# Patient Record
Sex: FEMALE | Race: BLACK OR AFRICAN AMERICAN | NOT HISPANIC OR LATINO | Employment: FULL TIME | URBAN - METROPOLITAN AREA
[De-identification: names, ages, dates, MRNs, and addresses within clinical notes are randomized per-mention and may not be internally consistent; named-entity substitution may affect disease eponyms.]

---

## 2017-01-12 ENCOUNTER — TELEPHONE (OUTPATIENT)
Dept: OBSTETRICS AND GYNECOLOGY | Facility: CLINIC | Age: 22
End: 2017-01-12

## 2017-01-12 NOTE — TELEPHONE ENCOUNTER
----- Message from Lyle Gonzalez sent at 1/12/2017 11:36 AM CST -----  Contact: pt   States she is calling to resched her nurse visit for Friday and can be reached at [phone]//thanks/dbw

## 2017-01-12 NOTE — TELEPHONE ENCOUNTER
----- Message from Martha Wild sent at 1/12/2017  3:35 PM CST -----  Contact: Pt   Pt called and stated she was returning a call to the nurse. She can be reached at [phone] (ryps)       Thanks

## 2017-01-12 NOTE — TELEPHONE ENCOUNTER
Phone just rings. Tried to reschedule nurse visit per Pt message but I do not see where there was one scheduled. Will try calling again. HIRA [place]

## 2017-01-13 ENCOUNTER — TELEPHONE (OUTPATIENT)
Dept: OBSTETRICS AND GYNECOLOGY | Facility: CLINIC | Age: 22
End: 2017-01-13

## 2017-01-13 NOTE — TELEPHONE ENCOUNTER
----- Message from Mary Carrera sent at 1/13/2017 12:46 PM CST -----  Contact: pt  Pt requests nurse to call her regarding scheduling her depo shot. Pt can be reached at [phone] (home)

## 2017-01-17 ENCOUNTER — CLINICAL SUPPORT (OUTPATIENT)
Dept: OBSTETRICS AND GYNECOLOGY | Facility: CLINIC | Age: 22
End: 2017-01-17
Payer: COMMERCIAL

## 2017-01-17 DIAGNOSIS — Z30.42 SURVEILLANCE FOR DEPO-PROVERA CONTRACEPTION: Primary | ICD-10-CM

## 2017-01-17 DIAGNOSIS — Z30.42 SURVEILLANCE FOR DEPO-PROVERA CONTRACEPTION: ICD-10-CM

## 2017-01-17 DIAGNOSIS — Z30.42 DEPO-PROVERA CONTRACEPTIVE STATUS: Primary | ICD-10-CM

## 2017-01-17 DIAGNOSIS — Z30.013 ENCOUNTER FOR INITIAL PRESCRIPTION OF INJECTABLE CONTRACEPTIVE: ICD-10-CM

## 2017-01-17 PROCEDURE — 99999 PR PBB SHADOW E&M-EST. PATIENT-LVL II: CPT | Mod: PBBFAC,,,

## 2017-01-17 PROCEDURE — 96372 THER/PROPH/DIAG INJ SC/IM: CPT | Mod: S$GLB,,, | Performed by: OBSTETRICS & GYNECOLOGY

## 2017-01-17 RX ORDER — MEDROXYPROGESTERONE ACETATE 150 MG/ML
150 INJECTION, SUSPENSION INTRAMUSCULAR ONCE
Status: DISCONTINUED | OUTPATIENT
Start: 2017-01-17 | End: 2017-06-30

## 2017-01-17 RX ORDER — MEDROXYPROGESTERONE ACETATE 150 MG/ML
150 INJECTION, SUSPENSION INTRAMUSCULAR
Status: DISCONTINUED | OUTPATIENT
Start: 2017-01-17 | End: 2017-06-30

## 2017-01-17 RX ORDER — MEDROXYPROGESTERONE ACETATE 150 MG/ML
150 INJECTION, SUSPENSION INTRAMUSCULAR ONCE
Status: CANCELLED | OUTPATIENT
Start: 2017-01-17 | End: 2017-01-17

## 2017-01-17 RX ORDER — MEDROXYPROGESTERONE ACETATE 150 MG/ML
150 INJECTION, SUSPENSION INTRAMUSCULAR ONCE
Status: COMPLETED | OUTPATIENT
Start: 2017-01-17 | End: 2017-01-17

## 2017-01-17 RX ADMIN — MEDROXYPROGESTERONE ACETATE 150 MG: 150 INJECTION, SUSPENSION INTRAMUSCULAR at 01:01

## 2017-01-17 NOTE — MR AVS SNAPSHOT
[facility] Obstetrics and Gynecology  [address]  [place] [address]  Phone: [phone]                  Mary Beth Cain   2017 10:30 AM   Clinical Support    Description:  Female : 1995   Provider:  OB GYN NURSEDORI   Department:  [place] - Obstetrics and Gynecology           Diagnoses this Visit        Comments    Depo-Provera contraceptive status    -  Primary            To Do List           Future Appointments        Provider Department Dept Phone    4/10/2017 9:00 AM [facility] [phone]      Goals (5 Years of Data)     None      [facility]     [facility] -  Assistance  Registered nurses in the [facility] provide clinical advisement, health education, appointment booking, and other advisory services.  Call for this free service at [phone].             Medications           Message regarding Medications     Verify the changes and/or additions to your medication regime listed below are the same as discussed with your clinician today.  If any of these changes or additions are incorrect, please notify your healthcare provider.             Verify that the below list of medications is an accurate representation of the medications you are currently taking.  If none reported, the list may be blank. If incorrect, please contact your healthcare provider. Carry this list with you in case of emergency.           Current Medications     divalproex (DEPAKOTE SPRINKLE) 125 mg capsule Take 1 at bedtime    levocetirizine (XYZAL) 5 MG tablet Take 5 mg by mouth every evening.    montelukast (SINGULAIR) 5 MG chewable tablet Take 5 mg by mouth once daily.    ranitidine (ZANTAC) 150 MG tablet Take 150 mg by mouth 2 (two) times daily.           Clinical Reference Information           Allergies as of 2017     No Known Allergies      Immunizations Administered on Date of Encounter - 2017      None      [facility] Sign-Up     Activating your [facility] account is as easy as 1-2-3!     1) Visit [email or web address], select Sign Up Now, enter this activation code and your date of birth, then select Next.  [email or web address]  Expires: 3/3/2017 10:26 AM      2) Create a username and password to use when you visit [facility] in the future and select a security question in case you lose your password and select Next.    3) Enter your e-mail address and click Sign Up!    Additional Information  If you have questions, please e-mail [email or web address] or call [phone] to talk to our [facility] staff. Remember, [facility] is NOT to be used for urgent needs. For medical emergencies, dial 911.

## 2017-01-17 NOTE — PROGRESS NOTES
Per Dr. Sánchez' records, patient's last injection was 10/19/16 and her last annual was 7/21/16.  Depo Provera 150mg given IM without difficulty.  Patient tolerated well.  Patient instructed to remain in clinic for 15 minutes following injection.  Appointment given for next injection and AVS handed to patient.  Patient verbalized understanding of instructions.

## 2017-04-10 ENCOUNTER — LAB VISIT (OUTPATIENT)
Dept: LAB | Facility: HOSPITAL | Age: 22
End: 2017-04-10
Attending: INTERNAL MEDICINE
Payer: COMMERCIAL

## 2017-04-10 ENCOUNTER — OFFICE VISIT (OUTPATIENT)
Dept: INTERNAL MEDICINE | Facility: CLINIC | Age: 22
End: 2017-04-10
Payer: COMMERCIAL

## 2017-04-10 ENCOUNTER — CLINICAL SUPPORT (OUTPATIENT)
Dept: OBSTETRICS AND GYNECOLOGY | Facility: CLINIC | Age: 22
End: 2017-04-10
Payer: COMMERCIAL

## 2017-04-10 VITALS
OXYGEN SATURATION: 99 % | HEIGHT: 66 IN | RESPIRATION RATE: 18 BRPM | TEMPERATURE: 98 F | WEIGHT: 167.75 LBS | SYSTOLIC BLOOD PRESSURE: 130 MMHG | DIASTOLIC BLOOD PRESSURE: 82 MMHG | HEART RATE: 75 BPM | BODY MASS INDEX: 26.96 KG/M2

## 2017-04-10 DIAGNOSIS — M25.571 ACUTE RIGHT ANKLE PAIN: ICD-10-CM

## 2017-04-10 DIAGNOSIS — R60.0 LOCALIZED EDEMA: ICD-10-CM

## 2017-04-10 DIAGNOSIS — K21.9 GASTROESOPHAGEAL REFLUX DISEASE, ESOPHAGITIS PRESENCE NOT SPECIFIED: ICD-10-CM

## 2017-04-10 DIAGNOSIS — K59.00 CONSTIPATION, UNSPECIFIED CONSTIPATION TYPE: ICD-10-CM

## 2017-04-10 DIAGNOSIS — Z30.42 ENCOUNTER FOR SURVEILLANCE OF INJECTABLE CONTRACEPTIVE: Primary | ICD-10-CM

## 2017-04-10 DIAGNOSIS — K21.9 GASTROESOPHAGEAL REFLUX DISEASE, ESOPHAGITIS PRESENCE NOT SPECIFIED: Primary | ICD-10-CM

## 2017-04-10 LAB
25(OH)D3+25(OH)D2 SERPL-MCNC: 23 NG/ML
ALBUMIN SERPL BCP-MCNC: 4 G/DL
ALP SERPL-CCNC: 53 U/L
ALT SERPL W/O P-5'-P-CCNC: 21 U/L
ANION GAP SERPL CALC-SCNC: 10 MMOL/L
AST SERPL-CCNC: 21 U/L
BACTERIA #/AREA URNS AUTO: NORMAL /HPF
BASOPHILS # BLD AUTO: 0.02 K/UL
BASOPHILS NFR BLD: 0.3 %
BILIRUB SERPL-MCNC: 0.4 MG/DL
BUN SERPL-MCNC: 13 MG/DL
CALCIUM SERPL-MCNC: 9.5 MG/DL
CHLORIDE SERPL-SCNC: 108 MMOL/L
CO2 SERPL-SCNC: 21 MMOL/L
CREAT SERPL-MCNC: 0.9 MG/DL
CRP SERPL-MCNC: 0.3 MG/L
DIFFERENTIAL METHOD: ABNORMAL
EOSINOPHIL # BLD AUTO: 0.8 K/UL
EOSINOPHIL NFR BLD: 14.1 %
ERYTHROCYTE [DISTWIDTH] IN BLOOD BY AUTOMATED COUNT: 12.6 %
ERYTHROCYTE [SEDIMENTATION RATE] IN BLOOD BY WESTERGREN METHOD: 14 MM/HR
EST. GFR  (AFRICAN AMERICAN): >60 ML/MIN/1.73 M^2
EST. GFR  (NON AFRICAN AMERICAN): >60 ML/MIN/1.73 M^2
GLUCOSE SERPL-MCNC: 81 MG/DL
HCT VFR BLD AUTO: 38.4 %
HGB BLD-MCNC: 13.1 G/DL
LYMPHOCYTES # BLD AUTO: 2.2 K/UL
LYMPHOCYTES NFR BLD: 36.7 %
MCH RBC QN AUTO: 31.3 PG
MCHC RBC AUTO-ENTMCNC: 34.1 %
MCV RBC AUTO: 92 FL
MICROSCOPIC COMMENT: NORMAL
MONOCYTES # BLD AUTO: 0.6 K/UL
MONOCYTES NFR BLD: 10.7 %
NEUTROPHILS # BLD AUTO: 2.3 K/UL
NEUTROPHILS NFR BLD: 38.2 %
PLATELET # BLD AUTO: 211 K/UL
PMV BLD AUTO: 11.1 FL
POTASSIUM SERPL-SCNC: 4.3 MMOL/L
PROT SERPL-MCNC: 7.6 G/DL
RBC # BLD AUTO: 4.18 M/UL
RBC #/AREA URNS AUTO: 1 /HPF (ref 0–4)
SODIUM SERPL-SCNC: 139 MMOL/L
SQUAMOUS #/AREA URNS AUTO: 2 /HPF
T4 FREE SERPL-MCNC: 1 NG/DL
TSH SERPL DL<=0.005 MIU/L-ACNC: 2.34 UIU/ML
WBC # BLD AUTO: 5.89 K/UL
WBC #/AREA URNS AUTO: 2 /HPF (ref 0–5)

## 2017-04-10 PROCEDURE — 99204 OFFICE O/P NEW MOD 45 MIN: CPT | Mod: 25,S$GLB,, | Performed by: INTERNAL MEDICINE

## 2017-04-10 PROCEDURE — 1160F RVW MEDS BY RX/DR IN RCRD: CPT | Mod: S$GLB,,, | Performed by: INTERNAL MEDICINE

## 2017-04-10 PROCEDURE — 85025 COMPLETE CBC W/AUTO DIFF WBC: CPT

## 2017-04-10 PROCEDURE — 36415 COLL VENOUS BLD VENIPUNCTURE: CPT | Mod: PO

## 2017-04-10 PROCEDURE — 99999 PR PBB SHADOW E&M-EST. PATIENT-LVL II: CPT | Mod: PBBFAC,,,

## 2017-04-10 PROCEDURE — 99999 PR PBB SHADOW E&M-EST. PATIENT-LVL IV: CPT | Mod: PBBFAC,,, | Performed by: INTERNAL MEDICINE

## 2017-04-10 PROCEDURE — 96372 THER/PROPH/DIAG INJ SC/IM: CPT | Mod: S$GLB,,, | Performed by: OBSTETRICS & GYNECOLOGY

## 2017-04-10 PROCEDURE — 82306 VITAMIN D 25 HYDROXY: CPT

## 2017-04-10 PROCEDURE — 90715 TDAP VACCINE 7 YRS/> IM: CPT | Mod: S$GLB,,, | Performed by: INTERNAL MEDICINE

## 2017-04-10 PROCEDURE — 84439 ASSAY OF FREE THYROXINE: CPT

## 2017-04-10 PROCEDURE — 86140 C-REACTIVE PROTEIN: CPT

## 2017-04-10 PROCEDURE — 82300 ASSAY OF CADMIUM: CPT

## 2017-04-10 PROCEDURE — 80053 COMPREHEN METABOLIC PANEL: CPT

## 2017-04-10 PROCEDURE — 84443 ASSAY THYROID STIM HORMONE: CPT

## 2017-04-10 PROCEDURE — 90471 IMMUNIZATION ADMIN: CPT | Mod: S$GLB,,, | Performed by: INTERNAL MEDICINE

## 2017-04-10 PROCEDURE — 85651 RBC SED RATE NONAUTOMATED: CPT

## 2017-04-10 RX ORDER — MEDROXYPROGESTERONE ACETATE 150 MG/ML
150 INJECTION, SUSPENSION INTRAMUSCULAR
Status: DISCONTINUED | OUTPATIENT
Start: 2017-04-10 | End: 2017-06-30

## 2017-04-10 RX ORDER — IBUPROFEN 600 MG/1
600 TABLET ORAL 3 TIMES DAILY
COMMUNITY
End: 2017-06-30 | Stop reason: ALTCHOICE

## 2017-04-10 RX ADMIN — MEDROXYPROGESTERONE ACETATE 150 MG: 150 INJECTION, SUSPENSION INTRAMUSCULAR at 10:04

## 2017-04-10 NOTE — MR AVS SNAPSHOT
[facility] Internal Medicine  [address]  [place] [address]  Phone: [phone]                  Mary Beth Cain   4/10/2017 10:40 AM   Office Visit    Description:  Female : 1995   Provider:  Sterling Chow MD   Department:  [facility] Internal Medicine           Reason for Visit     Ankle Pain           Diagnoses this Visit        Comments    Gastroesophageal reflux disease, esophagitis presence not specified    -  Primary     Acute right ankle pain         Constipation, unspecified constipation type         Localized edema                To Do List           Future Appointments        Provider Department Dept Phone    5/10/2017 9:00 AM Sterling Chow MD [facility] Internal Medicine [phone]    2017 10:45 AM Maryse Sánchez MD [place] - Obstetrics and Gynecology [phone]      Goals (5 Years of Data)     None      Follow-Up and Disposition     Return in about 4 weeks (around 2017), or if symptoms worsen or fail to improve, for FU on constipation and swelling.       These Medications        Disp Refills Start End    ranitidine (ZANTAC) 300 MG tablet 60 tablet 11 4/10/2017     Take 1 tablet (300 mg total) by mouth 2 (two) times daily as needed for Heartburn. - Oral    Pharmacy: [facility] [address] [place] [address] [facility] Ph #: [phone]         [facility] On [facility]     [facility] On Call Nurse Care Line -  Assistance  Unless otherwise directed by your provider, please contact [facility], our nurse care line that is available for  assistance.     Registered nurses in the [facility] provide: appointment scheduling, clinical advisement, health education, and other advisory services.  Call: [phone] (toll free)               Medications           Message regarding Medications     Verify the changes and/or additions to your medication regime listed below are the same as discussed with  "your clinician today.  If any of these changes or additions are incorrect, please notify your healthcare provider.        CHANGE how you are taking these medications     Start Taking Instead of    ranitidine (ZANTAC) 300 MG tablet ranitidine (ZANTAC) 150 MG tablet    Dosage:  Take 1 tablet (300 mg total) by mouth 2 (two) times daily as needed for Heartburn. Dosage:  Take 150 mg by mouth 2 (two) times daily.    Reason for Change:  Reorder       STOP taking these medications     divalproex (DEPAKOTE SPRINKLE) 125 mg capsule Take 1 at bedtime    montelukast (SINGULAIR) 5 MG chewable tablet Take 5 mg by mouth once daily.    levocetirizine (XYZAL) 5 MG tablet Take 5 mg by mouth every evening.           Verify that the below list of medications is an accurate representation of the medications you are currently taking.  If none reported, the list may be blank. If incorrect, please contact your healthcare provider. Carry this list with you in case of emergency.           Current Medications     ibuprofen (ADVIL,MOTRIN) 600 MG tablet Take 600 mg by mouth 3 (three) times daily.    ranitidine (ZANTAC) 300 MG tablet Take 1 tablet (300 mg total) by mouth 2 (two) times daily as needed for Heartburn.           Clinical Reference Information           Your Vitals Were     BP Pulse Temp Resp    130/82 (BP Location: Left arm, Patient Position: Sitting, BP Method: Manual) 75 98.1 °F (36.7 °C) (Tympanic) 18    Height Weight SpO2 BMI    5' 6" (1.676 m) 76.1 kg (167 lb 12.3 oz) 99% 27.08 kg/m2      Blood Pressure          Most Recent Value    BP  130/82      Allergies as of 4/10/2017     Mite Extract    Strawberry      Immunizations Administered on Date of Encounter - 4/10/2017     Name Date Dose VIS Date Route    TDAP  Incomplete 0.5 mL 2/24/2015 Intramuscular      Orders Placed During Today's Visit      Normal Orders This Visit    Tdap Vaccine     Urinalysis Microscopic     Future Labs/Procedures Expected by Expires    CBC auto " differential  4/10/2017 6/9/2018    Comprehensive metabolic panel  4/10/2017 6/9/2018    Heavy Metals Screen, Blood (Quantitative)  4/10/2017 6/9/2018    Sedimentation rate, manual  4/10/2017 6/9/2018    T4, free  4/10/2017 6/9/2018    TSH  4/10/2017 6/9/2018    Vitamin D  4/10/2017 6/9/2018    X-Ray Ankle 2 View Right  4/10/2017 4/10/2018    C-reactive protein  10/7/2017 (Approximate) 4/10/2018      [facility] Sign-Up     Activating your [facility] account is as easy as 1-2-3!     1) Visit [email or web address], select Sign Up Now, enter this activation code and your date of birth, then select Next.  [email or web address]  Expires: 5/25/2017  1:31 PM      2) Create a username and password to use when you visit [facility] in the future and select a security question in case you lose your password and select Next.    3) Enter your e-mail address and click Sign Up!    Additional Information  If you have questions, please e-mail [email or web address] or call [phone] to talk to our [facility] staff. Remember, [facility] is NOT to be used for urgent needs. For medical emergencies, dial 911.         Language Assistance Services     ATTENTION: Language assistance services are available, free of charge. Please call [phone].      ATENCIÓN: Si habla español, tiene a august disposición servicios gratuitos de asistencia lingüística. Llame al [phone].     CHÚ Ý: N?u b?n nói Ti?ng Vi?t, có các d?ch v? h? tr? ngôn ng? mi?n phí dành cho b?n. G?i s? [phone].         [facility] complies with applicable Federal civil rights laws and does not discriminate on the basis of race, color, national origin, age, disability, or sex.

## 2017-04-10 NOTE — PROGRESS NOTES
Subjective:      Patient ID: Mary Beth Cain is a 21 y.o. female.    Chief Complaint: Ankle Pain (Right)    HPI  Ms. Cain presents due to right ankle pain and to establish primary care.    She reports having right ankle pain since 4/7/17 at rest (6-7/10 intensity) and worse when walking (10/10 intensity). She describes her pain as a sharp, pressure sensation and can be exacerbated by slight movement. She has been taking ibuprofen 600 mg po qd for her pain.     She notes foot swelling 3/26 that self-resolved with leg elevation. This has occurred 2-3 times in the past.     She also notes chronic LBP since 10/2015. No joint stiffness. She reports feeling the pain when she takes a pt's temp at [employer], where she works.    Past Medical History:   Diagnosis Date    Chronic low back pain     Constipation     MgSO4, stool softeners    Environmental allergies     Gastritis     GERD (gastroesophageal reflux disease)     Headache      Past Surgical History:   Procedure Laterality Date    TONSILLECTOMY, ADENOIDECTOMY       Social History     Social History    Marital status: Single     Spouse name: N/A    Number of children: N/A    Years of education: N/A     Occupational History    Not on file.     Social History Main Topics    Smoking status: Never Smoker    Smokeless tobacco: Not on file    Alcohol use No    Drug use: No    Sexual activity: Not on file     Other Topics Concern    Not on file     Social History Narrative    Works nights at [employer].        Breakfast: Skips or left overs (crawfish 3x/wk) or eggs; water    Lunch: Skips or left overs or crawfish or Sonic: slushy and manuel and cheeseburger with fries; water    Dinner: Greens, smoked sausage, baked chicken and cornbread or pizza; water    Snacks: Pickles occasional    Eats out: 2x/wk     Family History   Problem Relation Age of Onset    Diabetes Maternal Uncle     Diabetes Paternal Uncle        Current Outpatient Prescriptions:     ibuprofen  "(ADVIL,MOTRIN) 600 MG tablet, Take 600 mg by mouth 3 (three) times daily., Disp: , Rfl:     ranitidine (ZANTAC) 300 MG tablet, Take 1 tablet (300 mg total) by mouth 2 (two) times daily as needed for Heartburn., Disp: 60 tablet, Rfl: 11    Current Facility-Administered Medications:     medroxyPROGESTERone (DEPO-PROVERA) injection 150 mg, 150 mg, Intramuscular, Q90 Days, Maryse Sánchez MD    medroxyPROGESTERone (DEPO-PROVERA) injection 150 mg, 150 mg, Intramuscular, Q90 Days, Maryse Sánchez MD    medroxyPROGESTERone (DEPO-PROVERA) injection 150 mg, 150 mg, Intramuscular, Once, Maryse Sánchez MD    medroxyPROGESTERone (DEPO-PROVERA) injection 150 mg, 150 mg, Intramuscular, Q90 Days, Maryse Sánchez MD, 150 mg at 04/10/17 1015    medroxyPROGESTERone (DEPO-PROVERA) syringe 150 mg, 150 mg, Intramuscular, Once, Maryse Sánchez MD    medroxyPROGESTERone (DEPO-PROVERA) syringe 150 mg, 150 mg, Intramuscular, Once, Maryse Sánchez MD    medroxyPROGESTERone (DEPO-PROVERA) syringe 150 mg, 150 mg, Intramuscular, Once, Maryse Sánchez MD    Review of patient's allergies indicates:   Allergen Reactions    Mite extract     Strawberry Swelling     Face and throat     Lab Results   Component Value Date    WBC 4.0 05/09/2014    HGB 12.7 05/09/2014    HCT 37.2 05/09/2014     05/09/2014    CHOL 142 05/09/2014    TRIG 35 05/09/2014    HDL 60 05/09/2014    ALT 9 05/09/2014    AST 16 05/09/2014     05/09/2014    K 3.6 05/09/2014     05/09/2014    CREATININE 0.68 05/09/2014    BUN 12 05/09/2014    CO2 23 05/09/2014    TSH 1.440 05/09/2014       /82 (BP Location: Left arm, Patient Position: Sitting, BP Method: Manual)  Pulse 75  Temp 98.1 °F (36.7 °C) (Tympanic)   Resp 18  Ht 5' 6" (1.676 m)  Wt 76.1 kg (167 lb 12.3 oz)  SpO2 99%  BMI 27.08 kg/m2    Review of Systems   Constitutional: Negative.   Cardiovascular: Negative.   Respiratory: Negative.   Gastrointestinal: Negative, except for " constipation.  Genitourinary: Negative.   Musculoskeletal: Negative.   Derm: Negative.  Allergic/Immunologic: Negative.   Endocrine: Negative.   Hematological: Negative.   Neurological: Negative.   Psychiatric/Behavioral: Negative.     Objective:     Physical Exam  GEN: A&O fully, NAD  PSYC: Normal affect  EXT: No C/C/E, FROM, but pain with inversion on right ankle and TTP at medial maleolus    No results found for: HGBA1C    Assessment:      1. Gastroesophageal reflux disease, esophagitis presence not specified    2. Acute right ankle pain    3. Constipation, unspecified constipation type    4. Localized edema      Plan:   Gastroesophageal reflux disease, esophagitis presence not specified  -     ranitidine (ZANTAC) 300 MG tablet; Take 1 tablet (300 mg total) by mouth 2 (two) times daily as needed for Heartburn.  Dispense: 60 tablet; Refill: 11  -     Heavy Metals Screen, Blood (Quantitative); Future; Expected date: 4/10/17    Acute right ankle pain  -     C-reactive protein; Future; Expected date: 10/7/17  -     Sedimentation rate, manual; Future; Expected date: 4/10/17  -     X-Ray Ankle 2 View Right; Future; Expected date: 4/10/17    Constipation, unspecified constipation type  -     TSH; Future; Expected date: 4/10/17  -     T4, free; Future; Expected date: 4/10/17  -     Heavy Metals Screen, Blood (Quantitative); Future; Expected date: 4/10/17    Localized edema  -     Urinalysis Microscopic  -     Heavy Metals Screen, Blood (Quantitative); Future; Expected date: 4/10/17      RTC in 4 weeks for FU on ankle pain

## 2017-04-10 NOTE — PROGRESS NOTES
Pt given Depo-Provera 150mg given IM as ordered by . Pt instructed to wait 15 minutes following injection for possible reaction. Pt verbalized understanding.

## 2017-04-12 LAB
ARSENIC BLD-MCNC: 2 NG/ML (ref 0–12)
CADMIUM BLD-MCNC: 0.2 NG/ML (ref 0–4.9)
CITY: NORMAL
COUNTY: NORMAL
GUARDIAN FIRST NAME: NORMAL
GUARDIAN LAST NAME: NORMAL
HOME PHONE: NORMAL
LEAD BLD-MCNC: <1 MCG/DL (ref 0–4.9)
MERCURY BLD-MCNC: 4 NG/ML (ref 0–9)
RACE: NORMAL
STATE: NORMAL
STREET ADDRESS: NORMAL
VENOUS/CAPILLARY: NORMAL
ZIP: NORMAL

## 2017-05-10 ENCOUNTER — APPOINTMENT (OUTPATIENT)
Dept: RADIOLOGY | Facility: HOSPITAL | Age: 22
End: 2017-05-10
Attending: INTERNAL MEDICINE
Payer: COMMERCIAL

## 2017-05-10 ENCOUNTER — OFFICE VISIT (OUTPATIENT)
Dept: INTERNAL MEDICINE | Facility: CLINIC | Age: 22
End: 2017-05-10
Payer: COMMERCIAL

## 2017-05-10 VITALS
DIASTOLIC BLOOD PRESSURE: 72 MMHG | SYSTOLIC BLOOD PRESSURE: 118 MMHG | HEIGHT: 66 IN | HEART RATE: 70 BPM | TEMPERATURE: 98 F | WEIGHT: 168.63 LBS | BODY MASS INDEX: 27.1 KG/M2 | OXYGEN SATURATION: 98 % | RESPIRATION RATE: 18 BRPM

## 2017-05-10 DIAGNOSIS — R60.0 LOCALIZED EDEMA: ICD-10-CM

## 2017-05-10 DIAGNOSIS — M25.571 ACUTE RIGHT ANKLE PAIN: ICD-10-CM

## 2017-05-10 DIAGNOSIS — K21.9 GASTROESOPHAGEAL REFLUX DISEASE, ESOPHAGITIS PRESENCE NOT SPECIFIED: Primary | ICD-10-CM

## 2017-05-10 DIAGNOSIS — K59.00 CONSTIPATION, UNSPECIFIED CONSTIPATION TYPE: ICD-10-CM

## 2017-05-10 DIAGNOSIS — M41.9 SCOLIOSIS, UNSPECIFIED SCOLIOSIS TYPE, UNSPECIFIED SPINAL REGION: ICD-10-CM

## 2017-05-10 PROCEDURE — 1160F RVW MEDS BY RX/DR IN RCRD: CPT | Mod: S$GLB,,, | Performed by: INTERNAL MEDICINE

## 2017-05-10 PROCEDURE — 72080 X-RAY EXAM THORACOLMB 2/> VW: CPT | Mod: 26,,, | Performed by: RADIOLOGY

## 2017-05-10 PROCEDURE — 99999 PR PBB SHADOW E&M-EST. PATIENT-LVL III: CPT | Mod: PBBFAC,,, | Performed by: INTERNAL MEDICINE

## 2017-05-10 PROCEDURE — 99214 OFFICE O/P EST MOD 30 MIN: CPT | Mod: S$GLB,,, | Performed by: INTERNAL MEDICINE

## 2017-05-10 PROCEDURE — 72080 X-RAY EXAM THORACOLMB 2/> VW: CPT | Mod: TC,PO

## 2017-05-10 NOTE — MR AVS SNAPSHOT
[facility] Internal Medicine  [address] [place] [address] D  [place] [address]  Phone: [phone]                  Mary Beth Cain   5/10/2017 9:00 AM   Office Visit    Description:  Female : 1995   Provider:  Sterling Chow MD   Department:  [facility] Internal Medicine           Reason for Visit     Follow-up     Ankle Pain           Diagnoses this Visit        Comments    Gastroesophageal reflux disease, esophagitis presence not specified    -  Primary     Acute right ankle pain         Constipation, unspecified constipation type         Localized edema         Scoliosis, unspecified scoliosis type, unspecified spinal region                To Do List           Future Appointments        Provider Department Dept Phone    5/10/2017 10:45 AM SHARON XR1 [facility]     2017 9:00 AM Sterling Chow MD [facility] Internal Medicine [phone]    2017 10:45 AM Maryse Sánchez MD [place] - Obstetrics and Gynecology [phone]      Goals (5 Years of Data)     None      Follow-Up and Disposition     Return in about 4 weeks (around 2017), or if symptoms worsen or fail to improve, for FU constipation & possible hormonal imbalance.    Follow-up and Disposition History      [facility]     [facility] Care [facility] -  Assistance  Unless otherwise directed by your provider, please contact [facility], our nurse care line that is available for  assistance.     Registered nurses in the [facility] Center provide: appointment scheduling, clinical advisement, health education, and other advisory services.  Call: [phone] (toll free)               Medications           Message regarding Medications     Verify the changes and/or additions to your medication regime listed below are the same as discussed with your clinician today.  If any of these changes or additions are incorrect, please notify your healthcare provider.             Verify that the below  "list of medications is an accurate representation of the medications you are currently taking.  If none reported, the list may be blank. If incorrect, please contact your healthcare provider. Carry this list with you in case of emergency.           Current Medications     ibuprofen (ADVIL,MOTRIN) 600 MG tablet Take 600 mg by mouth 3 (three) times daily.    ranitidine (ZANTAC) 300 MG tablet Take 1 tablet (300 mg total) by mouth 2 (two) times daily as needed for Heartburn.           Clinical Reference Information           Your Vitals Were     BP Pulse Temp Resp    118/72 (BP Location: Left arm, Patient Position: Sitting, BP Method: Manual) 70 98 °F (36.7 °C) (Tympanic) 18    Height Weight SpO2 BMI    5' 6" (1.676 m) 76.5 kg (168 lb 10.4 oz) 98% 27.22 kg/m2      Blood Pressure          Most Recent Value    BP  118/72      Allergies as of 5/10/2017     Mite Extract    Strawberry      Immunizations Administered on Date of Encounter - 5/10/2017     None      Orders Placed During Today's Visit     Future Labs/Procedures Expected by Expires    X-Ray Thoracolumbar Spine AP Lateral  5/10/2017 5/10/2018      [facility] Sign-Up     Activating your [facility] account is as easy as 1-2-3!     1) Visit [email or web address], select Sign Up Now, enter this activation code and your date of birth, then select Next.  [email or web address]  Expires: 5/25/2017  1:31 PM      2) Create a username and password to use when you visit [facility] in the future and select a security question in case you lose your password and select Next.    3) Enter your e-mail address and click Sign Up!    Additional Information  If you have questions, please e-mail [email or web address] or call [phone] to talk to our [facility] staff. Remember, [facility] is NOT to be used for urgent needs. For medical emergencies, dial 911.         Language Assistance Services     ATTENTION: Language assistance services are available, free of charge. Please call [phone].  "     ATENCIÓN: Si habla español, tiene a august disposición servicios gratuitos de asistencia lingüística. Ruben al [phone].     CHÚ Ý: N?u b?n nói Ti?ng Vi?t, có các d?ch v? h? tr? ngôn ng? mi?n phí dành cho b?n. G?i s? [phone].         [facility] complies with applicable Federal civil rights laws and does not discriminate on the basis of race, color, national origin, age, disability, or sex.

## 2017-05-10 NOTE — PROGRESS NOTES
Subjective:      Patient ID: Mary Beth Cain is a 21 y.o. female.    Chief Complaint: Follow-up and Ankle Pain    HPI   Ms. Cain is a patient of mine, who presents for f/u on right ankle pain.     She reports her right ankle pain resolved.     She reports her constipation is unchanged. She reports having 1 BM so far this week. Last week she had 2. She reports trying the PEG, which hasn't helped. She reports drinking 135 oz water per day. She eats frozen yogurt 0-2x/wk.    She reports her GERD is improved, but occasionally has it. She feels it was improved by avoiding spicy foods. She reports taking ranitidine BID.     She wonders if she has a hormonal imbalance, which she attributes to vaginal dryness, difficulty treating BV.    Past Medical History:   Diagnosis Date    Chronic low back pain     Constipation     MgSO4, stool softeners    Environmental allergies     Gastritis     GERD (gastroesophageal reflux disease)     Headache      Past Surgical History:   Procedure Laterality Date    TONSILLECTOMY, ADENOIDECTOMY       Social History     Social History    Marital status: Single     Spouse name: N/A    Number of children: N/A    Years of education: N/A     Occupational History    Not on file.     Social History Main Topics    Smoking status: Never Smoker    Smokeless tobacco: Not on file    Alcohol use No    Drug use: No    Sexual activity: Not on file     Other Topics Concern    Not on file     Social History Narrative    Works nights at [employer].        Breakfast: Skips or left overs (crawfish 3x/wk) or eggs; water    Lunch: Skips or left overs or crawfish or Sonic: slushy and manuel and cheeseburger with fries; water    Dinner: Greens, smoked sausage, baked chicken and cornbread or pizza; water    Snacks: Pickles occasional    Eats out: 2x/wk     Family History   Problem Relation Age of Onset    Diabetes Maternal Uncle     Diabetes Paternal Uncle        Current Outpatient Prescriptions:      "ibuprofen (ADVIL,MOTRIN) 600 MG tablet, Take 600 mg by mouth 3 (three) times daily., Disp: , Rfl:     ranitidine (ZANTAC) 300 MG tablet, Take 1 tablet (300 mg total) by mouth 2 (two) times daily as needed for Heartburn., Disp: 60 tablet, Rfl: 11    Current Facility-Administered Medications:     medroxyPROGESTERone (DEPO-PROVERA) injection 150 mg, 150 mg, Intramuscular, Q90 Days, Maryse Sánchez MD    medroxyPROGESTERone (DEPO-PROVERA) injection 150 mg, 150 mg, Intramuscular, Q90 Days, Maryse Sánchez MD    medroxyPROGESTERone (DEPO-PROVERA) injection 150 mg, 150 mg, Intramuscular, Once, Maryse Sánchez MD    medroxyPROGESTERone (DEPO-PROVERA) injection 150 mg, 150 mg, Intramuscular, Q90 Days, Maryse Sánchez MD, 150 mg at 04/10/17 1015    medroxyPROGESTERone (DEPO-PROVERA) syringe 150 mg, 150 mg, Intramuscular, Once, Maryse Sánchez MD    medroxyPROGESTERone (DEPO-PROVERA) syringe 150 mg, 150 mg, Intramuscular, Once, Maryse Sánchez MD    medroxyPROGESTERone (DEPO-PROVERA) syringe 150 mg, 150 mg, Intramuscular, Once, Maryse Sánchez MD    Review of patient's allergies indicates:   Allergen Reactions    Mite extract     Strawberry Swelling     Face and throat     Review of Systems   Negative, except as in HPI    Objective:     /72 (BP Location: Left arm, Patient Position: Sitting, BP Method: Manual)  Pulse 70  Temp 98 °F (36.7 °C) (Tympanic)   Resp 18  Ht 5' 6" (1.676 m)  Wt 76.5 kg (168 lb 10.4 oz)  SpO2 98%  BMI 27.22 kg/m2    Physical Exam  GEN: A&O fully, NAD  PSYC: Normal affect  MSK: Right ankle WNL, no TTP      Lab Results   Component Value Date    WBC 5.89 04/10/2017    HGB 13.1 04/10/2017    HCT 38.4 04/10/2017     04/10/2017    CHOL 142 05/09/2014    TRIG 35 05/09/2014    HDL 60 05/09/2014    ALT 21 04/10/2017    AST 21 04/10/2017     04/10/2017    K 4.3 04/10/2017     04/10/2017    CREATININE 0.9 04/10/2017    BUN 13 04/10/2017    CO2 21 (L) 04/10/2017    TSH 2.341 " 04/10/2017     No results found for: HGBA1C    Assessment:   1. Back pain, thoracic and lumbar: She thinks she may have scoliosis, but has heard different       opinions from providers in the past. Will check T&L spine. No sexually active currently. She had       physical therapy x1 day, which didn't help. Recommended Tylenol 650 mg by mouth three times       Daily.   2. GERD: Improved. Encouraged to avoid spicy & tomato based foods.  3. R ankle pain: Resolved.   4. Constipation: Persistent. Encouraged avoiding fast foods/meat and increasing yogurt to 1-5x/day.       Will consider lactulose if not improved in 1 month.    Plan:   Gastroesophageal reflux disease, esophagitis presence not specified    Acute right ankle pain    Constipation, unspecified constipation type    Localized edema    Scoliosis, unspecified scoliosis type, unspecified spinal region  -     X-Ray Thoracolumbar Spine AP Lateral; Future; Expected date: 5/10/17        RTC in 4 weeks RE possible hormonal imbalance, constipation or sooner as needed

## 2017-06-09 ENCOUNTER — APPOINTMENT (OUTPATIENT)
Dept: RADIOLOGY | Facility: HOSPITAL | Age: 22
End: 2017-06-09
Attending: INTERNAL MEDICINE
Payer: COMMERCIAL

## 2017-06-09 ENCOUNTER — OFFICE VISIT (OUTPATIENT)
Dept: INTERNAL MEDICINE | Facility: CLINIC | Age: 22
End: 2017-06-09
Payer: COMMERCIAL

## 2017-06-09 VITALS
TEMPERATURE: 99 F | SYSTOLIC BLOOD PRESSURE: 130 MMHG | HEIGHT: 66 IN | WEIGHT: 173.5 LBS | RESPIRATION RATE: 18 BRPM | BODY MASS INDEX: 27.88 KG/M2 | OXYGEN SATURATION: 98 % | HEART RATE: 89 BPM | DIASTOLIC BLOOD PRESSURE: 72 MMHG

## 2017-06-09 DIAGNOSIS — M25.561 ACUTE PAIN OF RIGHT KNEE: ICD-10-CM

## 2017-06-09 DIAGNOSIS — R60.0 LOCALIZED EDEMA: ICD-10-CM

## 2017-06-09 DIAGNOSIS — K21.9 GASTROESOPHAGEAL REFLUX DISEASE, ESOPHAGITIS PRESENCE NOT SPECIFIED: ICD-10-CM

## 2017-06-09 DIAGNOSIS — K59.00 CONSTIPATION, UNSPECIFIED CONSTIPATION TYPE: ICD-10-CM

## 2017-06-09 PROBLEM — M25.571 ACUTE RIGHT ANKLE PAIN: Status: RESOLVED | Noted: 2017-05-10 | Resolved: 2017-06-09

## 2017-06-09 PROCEDURE — 99999 PR PBB SHADOW E&M-EST. PATIENT-LVL III: CPT | Mod: PBBFAC,,, | Performed by: INTERNAL MEDICINE

## 2017-06-09 PROCEDURE — 73560 X-RAY EXAM OF KNEE 1 OR 2: CPT | Mod: TC,PO,LT

## 2017-06-09 PROCEDURE — 74000 XR ABDOMEN AP 1 VIEW: CPT | Mod: TC,PO

## 2017-06-09 PROCEDURE — 74000 XR ABDOMEN AP 1 VIEW: CPT | Mod: 26,,, | Performed by: RADIOLOGY

## 2017-06-09 PROCEDURE — 73562 X-RAY EXAM OF KNEE 3: CPT | Mod: 26,RT,, | Performed by: RADIOLOGY

## 2017-06-09 PROCEDURE — 99214 OFFICE O/P EST MOD 30 MIN: CPT | Mod: S$GLB,,, | Performed by: INTERNAL MEDICINE

## 2017-06-09 PROCEDURE — 73560 X-RAY EXAM OF KNEE 1 OR 2: CPT | Mod: 26,LT,, | Performed by: RADIOLOGY

## 2017-06-09 RX ORDER — SULFAMETHOXAZOLE AND TRIMETHOPRIM 800; 160 MG/1; MG/1
1 TABLET ORAL 2 TIMES DAILY
Qty: 14 TABLET | Refills: 0 | Status: SHIPPED | OUTPATIENT
Start: 2017-06-09 | End: 2017-06-16

## 2017-06-09 NOTE — PROGRESS NOTES
Subjective:      Patient ID: Mary Beth Cain is a 21 y.o. female.    Chief Complaint: Gastroesophageal Reflux; Follow-up; and Wound Infection      HPI  Ms. Cain is a patient of mine, who presents for f/u on GERD.     She reports persistent GERD, which is treated with ranitidine BID, which she feels helps. She has cut out red sauces, caffeine and spicy foods. No more than usual stress in life.     She reports having her belly button pierced in May and noted tenderness and pus when pushing on it. She has tried sea salt spray and took left over PNC from previous dental work to no avail.     She reports having a right swollen and painful knee over the past 3 days without any recent injury.     Past Medical History:   Diagnosis Date    Chronic low back pain     Constipation     MgSO4, stool softeners    Environmental allergies     Gastritis     GERD (gastroesophageal reflux disease)     Headache      Past Surgical History:   Procedure Laterality Date    TONSILLECTOMY, ADENOIDECTOMY       Social History     Social History    Marital status: Single     Spouse name: N/A    Number of children: N/A    Years of education: N/A     Occupational History    Not on file.     Social History Main Topics    Smoking status: Never Smoker    Smokeless tobacco: Not on file    Alcohol use No    Drug use: No    Sexual activity: Not on file     Other Topics Concern    Not on file     Social History Narrative    Works nights at [employer].        Breakfast: Skips or left overs (crawfish 3x/wk) or eggs; water    Lunch: Skips or left overs or crawfish or Sonic: slushy and manuel and cheeseburger with fries; water    Dinner: Greens, smoked sausage, baked chicken and cornbread or pizza; water    Snacks: Pickles occasional    Eats out: 2x/wk     Family History   Problem Relation Age of Onset    Diabetes Maternal Uncle     Diabetes Paternal Uncle        Current Outpatient Prescriptions:     ibuprofen (ADVIL,MOTRIN) 600 MG tablet,  "Take 600 mg by mouth 3 (three) times daily., Disp: , Rfl:     ranitidine (ZANTAC) 300 MG tablet, Take 1 tablet (300 mg total) by mouth 2 (two) times daily as needed for Heartburn., Disp: 60 tablet, Rfl: 11    sulfamethoxazole-trimethoprim 800-160mg (BACTRIM DS) 800-160 mg Tab, Take 1 tablet by mouth 2 (two) times daily., Disp: 14 tablet, Rfl: 0    Current Facility-Administered Medications:     medroxyPROGESTERone (DEPO-PROVERA) injection 150 mg, 150 mg, Intramuscular, Q90 Days, Maryse Sánchez MD    medroxyPROGESTERone (DEPO-PROVERA) injection 150 mg, 150 mg, Intramuscular, Q90 Days, Maryse Sánchez MD    medroxyPROGESTERone (DEPO-PROVERA) injection 150 mg, 150 mg, Intramuscular, Once, Maryse Sánchez MD    medroxyPROGESTERone (DEPO-PROVERA) injection 150 mg, 150 mg, Intramuscular, Q90 Days, Maryse Sánchez MD, 150 mg at 04/10/17 1015    medroxyPROGESTERone (DEPO-PROVERA) syringe 150 mg, 150 mg, Intramuscular, Once, Maryse Sánchez MD    medroxyPROGESTERone (DEPO-PROVERA) syringe 150 mg, 150 mg, Intramuscular, Once, Maryse Sánchez MD    medroxyPROGESTERone (DEPO-PROVERA) syringe 150 mg, 150 mg, Intramuscular, Once, Maryse Sánchez MD    Review of patient's allergies indicates:   Allergen Reactions    Mite extract     Strawberry Swelling     Face and throat     Review of Systems   Constitutional: Negative.   Cardiovascular: Negative.   Respiratory: Negative.   Gastrointestinal: Negative.   Genitourinary: Negative.   Musculoskeletal: Negative.   Derm: Negative.  Allergic/Immunologic: Negative.   Endocrine: Negative.   Hematological: Negative.   Neurological: Negative.   Psychiatric/Behavioral: Negative.     Objective:     /72 (BP Location: Left arm, Patient Position: Sitting, BP Method: Manual)   Pulse 89   Temp 98.7 °F (37.1 °C) (Tympanic)   Resp 18   Ht 5' 6" (1.676 m)   Wt 78.7 kg (173 lb 8 oz)   SpO2 98%   BMI 28.00 kg/m²     Physical Exam  GEN: A&O fully, NAD  PSYC: Normal affect  CV: " RRR, no M/G/R  PULM: CTA bilaterally, no wheezes, rales  GI: S/NT; mildly distended, normal bowel sounds  EXT: No C/C; right knee slightly warm and edematous compared to left knee; FROM bilaterally  DERM: <1 drop pus expressible from palpating belly button piercing, which is in tact    Lab Results   Component Value Date    WBC 5.89 04/10/2017    HGB 13.1 04/10/2017    HCT 38.4 04/10/2017     04/10/2017    CHOL 142 05/09/2014    TRIG 35 05/09/2014    HDL 60 05/09/2014    ALT 21 04/10/2017    AST 21 04/10/2017     04/10/2017    K 4.3 04/10/2017     04/10/2017    CREATININE 0.9 04/10/2017    BUN 13 04/10/2017    CO2 21 (L) 04/10/2017    TSH 2.341 04/10/2017       Assessment:      1. Abscess, abdomen: Likely 2/2 to infected piercing. Recommended to take out belly button ring and take Bactrim DS BID for 7 days. She prefers to keep in her piercing because she doesn't want it to close up. Will check CBC w/diff and blood culture.    2.      Right knee pain: Will check x-ray. I recommend tylenol 650 mg by mouth three times daily and            ibuprofen 400 mg 1-2 tabs daily as needed for breakthrough back pain. Of note, ibuprofen and other            NSAIDS (nonsteroidal inflammatory drugs; i.e. Ibuprofen, Motrin, Advil, Aleve, Naprosyn, Aspirin,            Goodies, Stanback, etc.) should be taken with food (to protect your stomach from bleeding) and            should not be taken regularly beyond 1-2 weeks (to protect your kidneys).  3.      Constipation: Persistent despite dietary modifications, including drinking 120 oz water/day. Will check            abn x-ray.  4.      GERD: Persistent, but controlled with ranitidine. Will check for H pylori.     Plan:   Abscess, abdomen  -     sulfamethoxazole-trimethoprim 800-160mg (BACTRIM DS) 800-160 mg Tab; Take 1 tablet by mouth 2 (two) times daily.  Dispense: 14 tablet; Refill: 0  -     CBC auto differential; Future; Expected date: 06/09/2017    Constipation,  unspecified constipation type  -     X-Ray Abdomen AP 1 View; Future; Expected date: 06/09/2017    Gastroesophageal reflux disease, esophagitis presence not specified  -     H. PYLORI ANTIBODY, IGG; Future; Expected date: 06/09/2017    Localized edema    Acute pain of right knee  -     X-ray Knee Ortho Right; Future; Expected date: 06/09/2017      RTC in 2 weeks RE infected body piercing and right knee pain or sooner as needed

## 2017-06-12 ENCOUNTER — LAB VISIT (OUTPATIENT)
Dept: LAB | Facility: HOSPITAL | Age: 22
End: 2017-06-12
Attending: INTERNAL MEDICINE
Payer: COMMERCIAL

## 2017-06-12 DIAGNOSIS — R10.9 ABDOMINAL PAIN, UNSPECIFIED LOCATION: Primary | ICD-10-CM

## 2017-06-12 DIAGNOSIS — R10.9 ABDOMINAL PAIN, UNSPECIFIED LOCATION: ICD-10-CM

## 2017-06-12 PROBLEM — M85.80 OSTEOPENIA DETERMINED BY X-RAY: Status: ACTIVE | Noted: 2017-06-12

## 2017-06-12 PROCEDURE — 87338 HPYLORI STOOL AG IA: CPT

## 2017-06-12 NOTE — LETTER
Reyna 15, 2017    Mary Beth Cain  [address] [place] [address]         [facility]  [address] [place] [address] [address]   [place] [address]  Phone: [phone] Dear Ms. Mary Beth Cain:    Below are the results from your recent visit:    Resulted Orders   CBC auto differential   Result Value Ref Range    WBC 6.22 3.90 - 12.70 K/uL    RBC 4.02 4.00 - 5.40 M/uL    Hemoglobin 12.8 12.0 - 16.0 g/dL    Hematocrit 37.7 37.0 - 48.5 %    MCV 94 82 - 98 fL    MCH 31.8 (H) 27.0 - 31.0 pg    MCHC 34.0 32.0 - 36.0 %    RDW 12.6 11.5 - 14.5 %    Platelets 231 150 - 350 K/uL    MPV 11.0 9.2 - 12.9 fL    Gran # 2.5 1.8 - 7.7 K/uL    Lymph # 2.1 1.0 - 4.8 K/uL    Mono # 0.7 0.3 - 1.0 K/uL    Eos # 0.9 (H) 0.0 - 0.5 K/uL    Baso # 0.03 0.00 - 0.20 K/uL    Gran% 39.7 38.0 - 73.0 %    Lymph% 34.2 18.0 - 48.0 %    Mono% 10.9 4.0 - 15.0 %    Eosinophil% 14.5 (H) 0.0 - 8.0 %    Basophil% 0.5 0.0 - 1.9 %    Differential Method Automated    Blood culture   Result Value Ref Range    Blood Culture, Routine No growth after 5 days.      Your results are normal.  Please don't hesitate to call our office if you have any questions or concerns.      Sincerely,        Sterling Chow MD

## 2017-06-12 NOTE — LETTER
June 12, 2017    Mary Beth Cain  [address] [place] [address]         [facility]  [address] [place] [address] [address] [facility] [place] [address]  Phone: [phone] Dear Ms. Mary Beth Cain:    Below are the results from your recent visit:    Resulted Orders   CBC auto differential   Result Value Ref Range    WBC 6.22 3.90 - 12.70 K/uL    RBC 4.02 4.00 - 5.40 M/uL    Hemoglobin 12.8 12.0 - 16.0 g/dL    Hematocrit 37.7 37.0 - 48.5 %    MCV 94 82 - 98 fL    MCH 31.8 (H) 27.0 - 31.0 pg    MCHC 34.0 32.0 - 36.0 %    RDW 12.6 11.5 - 14.5 %    Platelets 231 150 - 350 K/uL    MPV 11.0 9.2 - 12.9 fL    Gran # 2.5 1.8 - 7.7 K/uL    Lymph # 2.1 1.0 - 4.8 K/uL    Mono # 0.7 0.3 - 1.0 K/uL    Eos # 0.9 (H) 0.0 - 0.5 K/uL    Baso # 0.03 0.00 - 0.20 K/uL    Gran% 39.7 38.0 - 73.0 %    Lymph% 34.2 18.0 - 48.0 %    Mono% 10.9 4.0 - 15.0 %    Eosinophil% 14.5 (H) 0.0 - 8.0 %    Basophil% 0.5 0.0 - 1.9 %    Differential Method Automated      Your results are normal.  Please don't hesitate to call our office if you have any questions or concerns.      Sincerely,        Sterling Chow MD

## 2017-06-15 NOTE — ADDENDUM NOTE
Encounter addended by: Sterling Chow MD on: 6/15/2017  3:12 PM<BR>    Actions taken: Letter status changed

## 2017-06-16 LAB — H PYLORI AG STL QL: NOT DETECTED

## 2017-06-30 ENCOUNTER — OFFICE VISIT (OUTPATIENT)
Dept: INTERNAL MEDICINE | Facility: CLINIC | Age: 22
End: 2017-06-30
Payer: COMMERCIAL

## 2017-06-30 ENCOUNTER — LAB VISIT (OUTPATIENT)
Dept: LAB | Facility: HOSPITAL | Age: 22
End: 2017-06-30
Attending: INTERNAL MEDICINE
Payer: COMMERCIAL

## 2017-06-30 VITALS
HEIGHT: 66 IN | HEART RATE: 88 BPM | BODY MASS INDEX: 27.07 KG/M2 | RESPIRATION RATE: 16 BRPM | OXYGEN SATURATION: 99 % | SYSTOLIC BLOOD PRESSURE: 120 MMHG | WEIGHT: 168.44 LBS | DIASTOLIC BLOOD PRESSURE: 68 MMHG | TEMPERATURE: 98 F

## 2017-06-30 DIAGNOSIS — E55.9 VITAMIN D DEFICIENCY: ICD-10-CM

## 2017-06-30 DIAGNOSIS — M25.569 ARTHRALGIA OF KNEE, UNSPECIFIED LATERALITY: ICD-10-CM

## 2017-06-30 DIAGNOSIS — M85.80 OSTEOPENIA DETERMINED BY X-RAY: ICD-10-CM

## 2017-06-30 DIAGNOSIS — M85.80 OSTEOPENIA DETERMINED BY X-RAY: Primary | ICD-10-CM

## 2017-06-30 LAB — 25(OH)D3+25(OH)D2 SERPL-MCNC: 22 NG/ML

## 2017-06-30 PROCEDURE — 36415 COLL VENOUS BLD VENIPUNCTURE: CPT | Mod: PO

## 2017-06-30 PROCEDURE — 99999 PR PBB SHADOW E&M-EST. PATIENT-LVL III: CPT | Mod: PBBFAC,,, | Performed by: INTERNAL MEDICINE

## 2017-06-30 PROCEDURE — 82306 VITAMIN D 25 HYDROXY: CPT

## 2017-06-30 PROCEDURE — 99214 OFFICE O/P EST MOD 30 MIN: CPT | Mod: S$GLB,,, | Performed by: INTERNAL MEDICINE

## 2017-06-30 RX ORDER — DICLOFENAC SODIUM 10 MG/G
2 GEL TOPICAL 4 TIMES DAILY
Qty: 100 G | Refills: 0 | Status: SHIPPED | OUTPATIENT
Start: 2017-06-30 | End: 2018-10-23

## 2017-06-30 RX ORDER — HYDROXYZINE PAMOATE 25 MG/1
CAPSULE ORAL
COMMUNITY
Start: 2017-05-17 | End: 2017-06-30 | Stop reason: ALTCHOICE

## 2017-06-30 NOTE — PROGRESS NOTES
"Subjective:      Patient ID: Mary Beth Cain is a 22 y.o. female.    Chief Complaint: Follow-up; Abscess; and Abdominal Pain      HPI  Ms. Cain is a patient of mine, who presents for FU on abscess and abdominal pain.    She reports R>L knee pain over the past 2 months. She denies any recent trauma, but she endorses running track. She also reports right elbow pain, which "locks up" for just an instant, which she feels inclined to move. She has been taking OTC Tylenol 500 mg 2 tab BID prn for her joint pains, which doesn't help.     Past Medical History:   Diagnosis Date    Chronic low back pain     Constipation     MgSO4, stool softeners    Environmental allergies     Gastritis     GERD (gastroesophageal reflux disease)     Headache      Past Surgical History:   Procedure Laterality Date    TONSILLECTOMY, ADENOIDECTOMY       Social History     Social History    Marital status: Single     Spouse name: N/A    Number of children: N/A    Years of education: N/A     Occupational History    Not on file.     Social History Main Topics    Smoking status: Never Smoker    Smokeless tobacco: Not on file    Alcohol use No    Drug use: No    Sexual activity: Not on file     Other Topics Concern    Not on file     Social History Narrative    Works nights at [employer].        Breakfast: Skips or left overs (crawfish 3x/wk) or eggs; water    Lunch: Skips or left overs or crawfish or Sonic: slushy and manuel and cheeseburger with fries; water    Dinner: Greens, smoked sausage, baked chicken and cornbread or pizza; water    Snacks: Pickles occasional    Eats out: 2x/wk     Family History   Problem Relation Age of Onset    Diabetes Maternal Uncle     Diabetes Paternal Uncle        Current Outpatient Prescriptions:     ranitidine (ZANTAC) 300 MG tablet, Take 1 tablet (300 mg total) by mouth 2 (two) times daily as needed for Heartburn., Disp: 60 tablet, Rfl: 11    diclofenac sodium (VOLTAREN) 1 % Gel, Apply 2 g " "topically 4 (four) times daily., Disp: 100 g, Rfl: 0  No current facility-administered medications for this visit.     Review of patient's allergies indicates:   Allergen Reactions    Mite extract     Strawberry Swelling     Face and throat     Review of Systems   Constitutional: Negative.     Objective:     /68 (BP Location: Right arm, Patient Position: Sitting, BP Method: Manual)   Pulse 88   Temp 98.4 °F (36.9 °C) (Tympanic)   Resp 16   Ht 5' 6" (1.676 m)   Wt 76.4 kg (168 lb 6.9 oz)   SpO2 99%   BMI 27.19 kg/m²     Physical Exam  GEN: A&O fully, NAD  PSYC: Normal affect  MSK: FROM of elbows & knees; no crepidus  GI: abdomen: s/nt/nd, no pus expressed with palpation, normal bs    Lab Results   Component Value Date    WBC 6.22 06/09/2017    HGB 12.8 06/09/2017    HCT 37.7 06/09/2017     06/09/2017    CHOL 142 05/09/2014    TRIG 35 05/09/2014    HDL 60 05/09/2014    ALT 21 04/10/2017    AST 21 04/10/2017     04/10/2017    K 4.3 04/10/2017     04/10/2017    CREATININE 0.9 04/10/2017    BUN 13 04/10/2017    CO2 21 (L) 04/10/2017    TSH 2.341 04/10/2017     IMAGING:  Reviewed x-rays of knees, which revealed osteopenia, but no OA or dislocation      Assessment:      1. Osteopenia determined by x-ray: Likely multifactorial, including vitamin D deficiency, genetic (GM with osteoporosis) and iatrogenic (depo-provera). Given known side-effect profile of depo-provera, will stop it. She continues to take either 400 or 1000 IU vitD3 almost daily. Encouraged her to shoot for 6450-0462 IU daily. She understands to schedule an appointment with Dr. Maryse Sánchez re: possibility of Mirena for birth control.    2. Vitamin D deficiency: Will check level today and if not improved since last visit, will consider vitmain D2 50,000 IU qWk.   3.      Abdominal abscess from pierced belly button: Improved. Encouraged good hygiene/cleaning of the area.  4.      Constipation, intermittent: Encouraged continued " hydration and decreasing meat intake from 3-4 to            1-2x/wk.   5.      Joint pains, nonspecific: No evidence of OA. Will monitor closely. Will tx with Voltaren gel prn.    Plan:   Osteopenia determined by x-ray  -     Vitamin D; Future; Expected date: 12/27/2017    Vitamin D deficiency    Arthralgia of knee, unspecified laterality  -     diclofenac sodium (VOLTAREN) 1 % Gel; Apply 2 g topically 4 (four) times daily.  Dispense: 100 g; Refill: 0      RTC in RE 3 months or sooner as needed

## 2017-07-03 DIAGNOSIS — E55.9 VITAMIN D DEFICIENCY: Primary | ICD-10-CM

## 2017-07-03 RX ORDER — ERGOCALCIFEROL 1.25 MG/1
50000 CAPSULE ORAL
Qty: 8 CAPSULE | Refills: 0 | Status: SHIPPED | OUTPATIENT
Start: 2017-07-03 | End: 2018-10-23

## 2017-07-24 ENCOUNTER — OFFICE VISIT (OUTPATIENT)
Dept: OBSTETRICS AND GYNECOLOGY | Facility: CLINIC | Age: 22
End: 2017-07-24
Payer: COMMERCIAL

## 2017-07-24 ENCOUNTER — LAB VISIT (OUTPATIENT)
Dept: LAB | Facility: HOSPITAL | Age: 22
End: 2017-07-24
Attending: OBSTETRICS & GYNECOLOGY
Payer: COMMERCIAL

## 2017-07-24 VITALS
BODY MASS INDEX: 25.74 KG/M2 | HEIGHT: 66 IN | WEIGHT: 160.19 LBS | SYSTOLIC BLOOD PRESSURE: 121 MMHG | DIASTOLIC BLOOD PRESSURE: 82 MMHG

## 2017-07-24 DIAGNOSIS — N91.2 AMENORRHEA DUE TO DEPO PROVERA: Primary | ICD-10-CM

## 2017-07-24 DIAGNOSIS — N91.2 AMENORRHEA DUE TO DEPO PROVERA: ICD-10-CM

## 2017-07-24 DIAGNOSIS — B37.31 YEAST VAGINITIS: ICD-10-CM

## 2017-07-24 DIAGNOSIS — Z01.419 ENCOUNTER FOR GYNECOLOGICAL EXAMINATION (GENERAL) (ROUTINE) WITHOUT ABNORMAL FINDINGS: ICD-10-CM

## 2017-07-24 DIAGNOSIS — Z11.3 SCREENING FOR GONORRHEA: ICD-10-CM

## 2017-07-24 LAB — HCG INTACT+B SERPL-ACNC: <1.2 MIU/ML

## 2017-07-24 PROCEDURE — 36415 COLL VENOUS BLD VENIPUNCTURE: CPT | Mod: PO

## 2017-07-24 PROCEDURE — 99395 PREV VISIT EST AGE 18-39: CPT | Mod: S$GLB,,, | Performed by: OBSTETRICS & GYNECOLOGY

## 2017-07-24 PROCEDURE — 84702 CHORIONIC GONADOTROPIN TEST: CPT

## 2017-07-24 PROCEDURE — 99999 PR PBB SHADOW E&M-EST. PATIENT-LVL III: CPT | Mod: PBBFAC,,, | Performed by: OBSTETRICS & GYNECOLOGY

## 2017-07-24 RX ORDER — MEDROXYPROGESTERONE ACETATE 150 MG/ML
150 INJECTION, SUSPENSION INTRAMUSCULAR
COMMUNITY
End: 2018-07-16

## 2017-07-24 NOTE — PROGRESS NOTES
Subjective:       Patient ID: Mary Beth Cain is a 22 y.o. female.    Chief Complaint:  Annual Exam      History of Present Illness  HPI  Annual Exam-Premenopausal  Patient presents for annual exam. The patient has no complaints today. The patient is sexually active--using condoms for contraception; last depo 4/10/17--but did not return for depo shot in july. GYN screening history: last pap: patient has never had a pap test. The patient wears seatbelts: yes. The patient participates in regular exercise: yes.--running Has the patient ever been transfused or tattooed?: yes. The patient reports that there is not domestic violence in her life.    Thinks she may have yeast infection  Usually amenorrheic with depo; but reports cramping a few weeks ago          GYN & OB HistoryNo LMP recorded (approximate). Patient has had an injection.   Date of Last Pap: No result found    OB History    Para Term  AB Living   0 0 0 0 0 0   SAB TAB Ectopic Multiple Live Births   0 0 0 0 0             Review of Systems  Review of Systems   Constitutional: Negative for activity change, appetite change, chills, diaphoresis, fatigue, fever and unexpected weight change.   HENT: Negative for mouth sores and tinnitus.    Eyes: Negative for discharge and visual disturbance.   Respiratory: Negative for cough, shortness of breath and wheezing.    Cardiovascular: Negative for chest pain, palpitations and leg swelling.   Gastrointestinal: Negative for abdominal pain, bloating, blood in stool, constipation, diarrhea, nausea and vomiting.   Endocrine: Negative for diabetes, hair loss, hot flashes, hyperthyroidism and hypothyroidism.   Genitourinary: Negative for decreased libido, dyspareunia, dysuria, flank pain, frequency, genital sores, hematuria, menorrhagia, menstrual problem, pelvic pain, urgency, vaginal bleeding, vaginal discharge, vaginal pain, dysmenorrhea, urinary incontinence, postcoital bleeding, postmenopausal bleeding and  vaginal odor.   Musculoskeletal: Negative for back pain and myalgias.   Skin:  Negative for rash, no acne and hair changes.   Neurological: Negative for seizures, syncope, numbness and headaches.   Hematological: Negative for adenopathy. Does not bruise/bleed easily.   Psychiatric/Behavioral: Negative for depression and sleep disturbance. The patient is not nervous/anxious.    Breast: Negative for breast mass, breast pain, nipple discharge and skin changes          Objective:    Physical Exam:   Constitutional: She appears well-developed.     Eyes: Conjunctivae and EOM are normal. Pupils are equal, round, and reactive to light.    Neck: Normal range of motion. Neck supple.     Pulmonary/Chest: Effort normal. Right breast exhibits no mass, no nipple discharge, no skin change and no tenderness. Left breast exhibits no mass, no nipple discharge, no skin change and no tenderness. Breasts are symmetrical.        Abdominal: Soft.     Genitourinary: Rectum normal and uterus normal. Pelvic exam was performed with patient supine. Cervix is normal. Right adnexum displays no mass and no tenderness. Left adnexum displays no mass and no tenderness. No erythema, bleeding, rectocele, cystocele or unspecified prolapse of vaginal walls in the vagina. Vaginal discharge found. Labial bartholins normal.       Uterus Size: 6 cm   Musculoskeletal: Normal range of motion.       Neurological: She is alert.    Skin: Skin is warm.    Psychiatric: She has a normal mood and affect.          Assessment:     Encounter Diagnoses   Name Primary?    Amenorrhea due to Depo Provera Yes    Screening for gonorrhea     Encounter for gynecological examination (general) (routine) without abnormal findings              Plan:      Continue annual well woman exam.  Pap not indicated due to age  Does not recall wether she received gardasil vaccine  Gc/ct/affirm today  [facility] [place]; return tomorrow for depo shot  Calcium 1000mg/d  Continue diet, exercise,  weight loss

## 2017-07-25 ENCOUNTER — CLINICAL SUPPORT (OUTPATIENT)
Dept: OBSTETRICS AND GYNECOLOGY | Facility: CLINIC | Age: 22
End: 2017-07-25
Payer: COMMERCIAL

## 2017-07-25 ENCOUNTER — TELEPHONE (OUTPATIENT)
Dept: OBSTETRICS AND GYNECOLOGY | Facility: CLINIC | Age: 22
End: 2017-07-25

## 2017-07-25 DIAGNOSIS — B96.89 BACTERIAL VAGINOSIS: Primary | ICD-10-CM

## 2017-07-25 DIAGNOSIS — Z30.9 ENCOUNTER FOR CONTRACEPTIVE MANAGEMENT, UNSPECIFIED TYPE: Primary | ICD-10-CM

## 2017-07-25 DIAGNOSIS — N76.0 BACTERIAL VAGINOSIS: Primary | ICD-10-CM

## 2017-07-25 LAB
C TRACH DNA SPEC QL NAA+PROBE: NOT DETECTED
CANDIDA RRNA VAG QL PROBE: NEGATIVE
G VAGINALIS RRNA GENITAL QL PROBE: POSITIVE
N GONORRHOEA DNA SPEC QL NAA+PROBE: NOT DETECTED
T VAGINALIS RRNA GENITAL QL PROBE: NEGATIVE

## 2017-07-25 PROCEDURE — 99999 PR PBB SHADOW E&M-EST. PATIENT-LVL I: CPT | Mod: PBBFAC,,,

## 2017-07-25 PROCEDURE — 96372 THER/PROPH/DIAG INJ SC/IM: CPT | Mod: S$GLB,,, | Performed by: OBSTETRICS & GYNECOLOGY

## 2017-07-25 RX ORDER — METRONIDAZOLE 7.5 MG/G
1 GEL VAGINAL DAILY
Qty: 5 APPLICATOR | Refills: 0 | Status: SHIPPED | OUTPATIENT
Start: 2017-07-25 | End: 2017-07-30

## 2017-07-25 RX ORDER — MEDROXYPROGESTERONE ACETATE 150 MG/ML
150 INJECTION, SUSPENSION INTRAMUSCULAR
Status: DISCONTINUED | OUTPATIENT
Start: 2017-07-25 | End: 2018-07-16

## 2017-07-25 RX ADMIN — MEDROXYPROGESTERONE ACETATE 150 MG: 150 INJECTION, SUSPENSION INTRAMUSCULAR at 09:07

## 2017-07-25 NOTE — TELEPHONE ENCOUNTER
Pt. Wants gel ([employer] [address]). [email or web address]    Please advise her vaginal culture is positive for bacterial vaginosis.  This is overgrowth of her normal bacteria.  Does she prefer pills or vaginal gel for treatment?  Also, cultures are negative for gonorrhea and chlamydia

## 2017-07-25 NOTE — PROGRESS NOTES
Per depo protocol, gave pt. Depo injection to right ventrogluteal area.  Pt. Advised to remain in the waiting area for 15 minutes to ensure no adverse reaction.  Pt. Given future depo dates. [email or web address],lpn

## 2017-10-10 ENCOUNTER — TELEPHONE (OUTPATIENT)
Dept: OBSTETRICS AND GYNECOLOGY | Facility: CLINIC | Age: 22
End: 2017-10-10

## 2017-10-10 DIAGNOSIS — Z30.011 ENCOUNTER FOR INITIAL PRESCRIPTION OF CONTRACEPTIVE PILLS: Primary | ICD-10-CM

## 2017-10-10 RX ORDER — NORGESTIMATE AND ETHINYL ESTRADIOL 7DAYSX3 28
1 KIT ORAL DAILY
Qty: 28 TABLET | Refills: 9 | Status: SHIPPED | OUTPATIENT
Start: 2017-10-10 | End: 2018-07-31 | Stop reason: SDUPTHER

## 2017-10-10 NOTE — TELEPHONE ENCOUNTER
----- Message from Lauren Estrada sent at 10/10/2017  9:44 AM CDT -----  Contact: Mary Beth  Patient came in for her depo shot but did not have insurance anymore. Patient would like to switch from the depo to the kyleena implant she would like to know if she can get a prescription called in to [facility] [address] [facility] [address]. Please call patient asap

## 2017-10-10 NOTE — TELEPHONE ENCOUNTER
Pt was given a letter stating when she had her last depo because she no longer has insurance and she wanted to take her letter to [facility] to continue birth control. It was too expensive. She wants to try trisprintec ocp instead. I told Pt I would forward message to provider and we will let her know how we are able to assist her. DS

## 2017-10-10 NOTE — TELEPHONE ENCOUNTER
----- Message from Jennifer Green sent at 10/10/2017 11:36 AM CDT -----  Contact: pt  States she's returning Stephanie's call. States her pharmacy is [facility] on [address] or [facility] in [place]. Please call pt at [phone]. Thank you

## 2018-07-13 ENCOUNTER — PATIENT OUTREACH (OUTPATIENT)
Dept: ADMINISTRATIVE | Facility: HOSPITAL | Age: 23
End: 2018-07-13

## 2018-07-16 ENCOUNTER — LAB VISIT (OUTPATIENT)
Dept: LAB | Facility: HOSPITAL | Age: 23
End: 2018-07-16
Attending: INTERNAL MEDICINE
Payer: COMMERCIAL

## 2018-07-16 ENCOUNTER — TELEPHONE (OUTPATIENT)
Dept: OBSTETRICS AND GYNECOLOGY | Facility: CLINIC | Age: 23
End: 2018-07-16

## 2018-07-16 ENCOUNTER — OFFICE VISIT (OUTPATIENT)
Dept: INTERNAL MEDICINE | Facility: CLINIC | Age: 23
End: 2018-07-16
Payer: COMMERCIAL

## 2018-07-16 VITALS
WEIGHT: 154.56 LBS | HEIGHT: 66 IN | DIASTOLIC BLOOD PRESSURE: 78 MMHG | SYSTOLIC BLOOD PRESSURE: 136 MMHG | BODY MASS INDEX: 24.84 KG/M2 | TEMPERATURE: 98 F

## 2018-07-16 DIAGNOSIS — Z00.00 ROUTINE GENERAL MEDICAL EXAMINATION AT A HEALTH CARE FACILITY: Primary | ICD-10-CM

## 2018-07-16 DIAGNOSIS — Z11.3 SCREENING FOR STD (SEXUALLY TRANSMITTED DISEASE): ICD-10-CM

## 2018-07-16 DIAGNOSIS — Z00.00 ROUTINE GENERAL MEDICAL EXAMINATION AT A HEALTH CARE FACILITY: ICD-10-CM

## 2018-07-16 DIAGNOSIS — E55.9 VITAMIN D DEFICIENCY: ICD-10-CM

## 2018-07-16 DIAGNOSIS — R30.0 DYSURIA: ICD-10-CM

## 2018-07-16 DIAGNOSIS — N94.10 DYSPAREUNIA, FEMALE: ICD-10-CM

## 2018-07-16 LAB
25(OH)D3+25(OH)D2 SERPL-MCNC: 26 NG/ML
ALBUMIN SERPL BCP-MCNC: 4.1 G/DL
ALP SERPL-CCNC: 46 U/L
ALT SERPL W/O P-5'-P-CCNC: 12 U/L
ANION GAP SERPL CALC-SCNC: 11 MMOL/L
AST SERPL-CCNC: 18 U/L
BASOPHILS # BLD AUTO: 0.03 K/UL
BASOPHILS NFR BLD: 0.5 %
BILIRUB SERPL-MCNC: 0.5 MG/DL
BUN SERPL-MCNC: 13 MG/DL
CALCIUM SERPL-MCNC: 10.3 MG/DL
CHLORIDE SERPL-SCNC: 105 MMOL/L
CHOLEST SERPL-MCNC: 160 MG/DL
CHOLEST/HDLC SERPL: 2.3 {RATIO}
CO2 SERPL-SCNC: 23 MMOL/L
CREAT SERPL-MCNC: 0.9 MG/DL
DIFFERENTIAL METHOD: ABNORMAL
EOSINOPHIL # BLD AUTO: 0.3 K/UL
EOSINOPHIL NFR BLD: 4.6 %
ERYTHROCYTE [DISTWIDTH] IN BLOOD BY AUTOMATED COUNT: 12.2 %
EST. GFR  (AFRICAN AMERICAN): >60 ML/MIN/1.73 M^2
EST. GFR  (NON AFRICAN AMERICAN): >60 ML/MIN/1.73 M^2
GLUCOSE SERPL-MCNC: 75 MG/DL
HCT VFR BLD AUTO: 37.8 %
HDLC SERPL-MCNC: 69 MG/DL
HDLC SERPL: 43.1 %
HGB BLD-MCNC: 12.5 G/DL
IMM GRANULOCYTES # BLD AUTO: 0.01 K/UL
IMM GRANULOCYTES NFR BLD AUTO: 0.2 %
LDLC SERPL CALC-MCNC: 80.8 MG/DL
LYMPHOCYTES # BLD AUTO: 2.3 K/UL
LYMPHOCYTES NFR BLD: 38.8 %
MCH RBC QN AUTO: 32 PG
MCHC RBC AUTO-ENTMCNC: 33.1 G/DL
MCV RBC AUTO: 97 FL
MICROSCOPIC COMMENT: ABNORMAL
MONOCYTES # BLD AUTO: 0.7 K/UL
MONOCYTES NFR BLD: 11.9 %
NEUTROPHILS # BLD AUTO: 2.6 K/UL
NEUTROPHILS NFR BLD: 44 %
NONHDLC SERPL-MCNC: 91 MG/DL
NRBC BLD-RTO: 0 /100 WBC
PLATELET # BLD AUTO: 232 K/UL
PMV BLD AUTO: 10.6 FL
POTASSIUM SERPL-SCNC: 4 MMOL/L
PROT SERPL-MCNC: 7.8 G/DL
RBC # BLD AUTO: 3.91 M/UL
RBC #/AREA URNS AUTO: 2 /HPF (ref 0–4)
SODIUM SERPL-SCNC: 139 MMOL/L
SQUAMOUS #/AREA URNS AUTO: 8 /HPF
TRIGL SERPL-MCNC: 51 MG/DL
TSH SERPL DL<=0.005 MIU/L-ACNC: 1.32 UIU/ML
WBC # BLD AUTO: 5.82 K/UL
WBC #/AREA URNS AUTO: 19 /HPF (ref 0–5)

## 2018-07-16 PROCEDURE — 99999 PR PBB SHADOW E&M-EST. PATIENT-LVL III: CPT | Mod: PBBFAC,,, | Performed by: INTERNAL MEDICINE

## 2018-07-16 PROCEDURE — 82306 VITAMIN D 25 HYDROXY: CPT

## 2018-07-16 PROCEDURE — 99395 PREV VISIT EST AGE 18-39: CPT | Mod: S$GLB,,, | Performed by: INTERNAL MEDICINE

## 2018-07-16 PROCEDURE — 36415 COLL VENOUS BLD VENIPUNCTURE: CPT | Mod: PO

## 2018-07-16 PROCEDURE — 86592 SYPHILIS TEST NON-TREP QUAL: CPT

## 2018-07-16 PROCEDURE — 85025 COMPLETE CBC W/AUTO DIFF WBC: CPT

## 2018-07-16 PROCEDURE — 87086 URINE CULTURE/COLONY COUNT: CPT

## 2018-07-16 PROCEDURE — 84443 ASSAY THYROID STIM HORMONE: CPT

## 2018-07-16 PROCEDURE — 80053 COMPREHEN METABOLIC PANEL: CPT

## 2018-07-16 PROCEDURE — 81001 URINALYSIS AUTO W/SCOPE: CPT

## 2018-07-16 PROCEDURE — 80061 LIPID PANEL: CPT

## 2018-07-16 RX ORDER — POLYETHYLENE GLYCOL 3350 17 G/17G
POWDER, FOR SOLUTION ORAL
COMMUNITY
End: 2019-03-11

## 2018-07-16 NOTE — TELEPHONE ENCOUNTER
----- Message from Lauren Estrada sent at 7/16/2018  3:41 PM CDT -----  Contact: Mary Beth  Patient would like someone to call her if a sooner appointment comes up than 08/13/2018 to see Dr. Sánchez  Thanks

## 2018-07-16 NOTE — TELEPHONE ENCOUNTER
Spoke with pt and she states she feels she needed to be soon as soon as possible. I was able to schedule her with rajan wu on tomorrow at 9am  Pt verbalized understanding.

## 2018-07-16 NOTE — PROGRESS NOTES
Subjective:      Patient ID: Mary Beth Cain is a 23 y.o. female.    Chief Complaint: Annual Exam      HPI     Ms. Mary Beth Cain is a patient of Sterling Chow MD, who presents for annual.    On 5/27/18 she noticed 2 lumps in her groin, which were initially painful, but have now decreased in size and pain, from constant to intermittent. It is now not hurting. The pain recurs 2x/wk, but tenderness remains. No dysuria. +stomach pain when having the urge to urinate, which resolved following urination. She was seen at [facility] ER on 7/2/18 for her sx, which were dx'd as LAD and LBP w/o sciatica and was prescribed NAPROXEN (NAPROSYN) 500 MG TABLET Take 1 tablet by mouth 2 (two) times daily with meals for 7 days. Of note, she did not report her vaginal dc at that time. Labs at [facility] reviewed with Ms. Cain. Same partner, sometimes with unprotected sex.    +constipation (chronic) 1-2 times per week. She uses Miralax. +hemorrhoids from straining. +BRBPR.    +vaginal discharge, which is white, but not thick. No vaginal itchiness.     +spotting between her menses with irregular cycles, which starts at different times than she would expect since they were ~1x/mo previously. Her last visit with her OBGYN, Dr. Maryse Sánchez was in 6/2017.      Past Medical History:   Diagnosis Date    Chronic low back pain     Constipation     MgSO4, stool softeners    Environmental allergies     Gastritis     GERD (gastroesophageal reflux disease)     Headache(784.0)      Past Surgical History:   Procedure Laterality Date    TONSILLECTOMY, ADENOIDECTOMY       Social History     Social History    Marital status: Single     Spouse name: N/A    Number of children: N/A    Years of education: N/A     Occupational History    Not on file.     Social History Main Topics    Smoking status: Never Smoker    Smokeless tobacco: Never Used    Alcohol use No    Drug use: No    Sexual activity: Yes     Partners: Male     Other Topics Concern     "Not on file     Social History Narrative    Works nights at [employer].        Breakfast: Skips or left overs (crawfish 3x/wk) or eggs; water    Lunch: Skips or left overs or crawfish or Sonic: slushy and manuel and cheeseburger with fries; water    Dinner: Greens, smoked sausage, baked chicken and cornbread or pizza; water    Snacks: Pickles occasional    Eats out: 2x/wk    Water: 12 oz/day     Family History   Problem Relation Age of Onset    Diabetes Maternal Uncle     Diabetes Paternal Uncle     Osteoporosis Mother        Current Outpatient Prescriptions:     norgestimate-ethinyl estradiol (ORTHO TRI-CYCLEN,TRI-SPRINTEC) 0.18/0.215/0.25 mg-35 mcg (28) tablet, Take 1 tablet by mouth once daily., Disp: 28 tablet, Rfl: 9    polyethylene glycol (GLYCOLAX) 17 gram PwPk, Take by mouth., Disp: , Rfl:     diclofenac sodium (VOLTAREN) 1 % Gel, Apply 2 g topically 4 (four) times daily., Disp: 100 g, Rfl: 0    ergocalciferol (ERGOCALCIFEROL) 50,000 unit Cap, Take 1 capsule (50,000 Units total) by mouth every 7 days., Disp: 8 capsule, Rfl: 0    ranitidine (ZANTAC) 300 MG tablet, Take 1 tablet (300 mg total) by mouth 2 (two) times daily as needed for Heartburn., Disp: 60 tablet, Rfl: 11  No current facility-administered medications for this visit.     Review of patient's allergies indicates:   Allergen Reactions    Mite extract     Strawberry Swelling     Face and throat        Review of Systems   All remaining systems negative    Objective:     /78 (BP Location: Left arm, Patient Position: Sitting, BP Method: Medium (Manual))   Temp 97.5 °F (36.4 °C) (Tympanic)   Ht 5' 6" (1.676 m)   Wt 70.1 kg (154 lb 8.7 oz)   LMP 06/14/2018 (Approximate)   BMI 24.94 kg/m²     Physical Exam  GEN: A&O fully, NAD  PSYC: Normal affect  CV: RRR, no M/G/R  PULM: CTA bilaterally, no wheezes, rales, crackles   GI: +TTP diffusely in abdomen, S/ND, normal bowel sounds  EXT: No C/C/E, normal DP pulses bilaterally  NEURO: CN II-XII " intact, 5/5 strength globally, no sensory losses, normal tandem gait, normal Romberg, 2+ DTRs globally  DERM: No significant inguinal LAD bilaterally      Lab Results   Component Value Date    WBC 6.22 06/09/2017    HGB 12.8 06/09/2017    HCT 37.7 06/09/2017     06/09/2017    CHOL 142 05/09/2014    TRIG 35 05/09/2014    HDL 60 05/09/2014    LDLCALC 75 05/09/2014    ALT 21 04/10/2017    AST 21 04/10/2017     04/10/2017    K 4.3 04/10/2017     04/10/2017    CREATININE 0.9 04/10/2017    BUN 13 04/10/2017    CO2 21 (L) 04/10/2017    TSH 2.341 04/10/2017       Assessment:      1. Routine general medical examination at a health care facility    2. Dysuria: Will check u/a and Ucx.   3. Vitamin D deficiency:    4. Dyspareunia, female: Will refer to her OBGYN, Dr. Maryse Sánchez, who can also perform tests for G&C.        Plan:   Routine general medical examination at a health care facility  -     CBC auto differential; Future; Expected date: 07/16/2018  -     Comprehensive metabolic panel; Future; Expected date: 07/16/2018  -     Lipid panel; Future; Expected date: 07/16/2018  -     TSH; Future; Expected date: 07/16/2018  -     Vitamin D; Future; Expected date: 07/16/2018  -     Urinalysis Microscopic  -     Urine culture  -     POCT URINE DIPSTICK WITH MICROSCOPE, AUTOMATED    Dysuria    Vitamin D deficiency    Dyspareunia, female    Screening for STD (sexually transmitted disease)  -     RPR; Future; Expected date: 07/16/2018        Follow-up in about 1 year (around 7/16/2019), or if symptoms worsen or fail to improve, for Annual.

## 2018-07-17 ENCOUNTER — OFFICE VISIT (OUTPATIENT)
Dept: OBSTETRICS AND GYNECOLOGY | Facility: CLINIC | Age: 23
End: 2018-07-17
Payer: COMMERCIAL

## 2018-07-17 VITALS
DIASTOLIC BLOOD PRESSURE: 70 MMHG | HEIGHT: 66 IN | BODY MASS INDEX: 25.22 KG/M2 | WEIGHT: 156.94 LBS | SYSTOLIC BLOOD PRESSURE: 106 MMHG

## 2018-07-17 DIAGNOSIS — N94.10 DYSPAREUNIA IN FEMALE: ICD-10-CM

## 2018-07-17 DIAGNOSIS — N92.6 IRREGULAR MENSES: ICD-10-CM

## 2018-07-17 DIAGNOSIS — N72 CERVICITIS: Primary | ICD-10-CM

## 2018-07-17 DIAGNOSIS — N76.0 BV (BACTERIAL VAGINOSIS): ICD-10-CM

## 2018-07-17 DIAGNOSIS — B96.89 BV (BACTERIAL VAGINOSIS): ICD-10-CM

## 2018-07-17 LAB — RPR SER QL: NORMAL

## 2018-07-17 PROCEDURE — 87210 SMEAR WET MOUNT SALINE/INK: CPT | Mod: QW,S$GLB,, | Performed by: NURSE PRACTITIONER

## 2018-07-17 PROCEDURE — 99214 OFFICE O/P EST MOD 30 MIN: CPT | Mod: S$GLB,,, | Performed by: NURSE PRACTITIONER

## 2018-07-17 PROCEDURE — 3008F BODY MASS INDEX DOCD: CPT | Mod: CPTII,S$GLB,, | Performed by: NURSE PRACTITIONER

## 2018-07-17 PROCEDURE — 81025 URINE PREGNANCY TEST: CPT | Mod: S$GLB,,, | Performed by: NURSE PRACTITIONER

## 2018-07-17 PROCEDURE — 99999 PR PBB SHADOW E&M-EST. PATIENT-LVL III: CPT | Mod: PBBFAC,,, | Performed by: NURSE PRACTITIONER

## 2018-07-17 PROCEDURE — 87491 CHLMYD TRACH DNA AMP PROBE: CPT

## 2018-07-17 RX ORDER — DOXYCYCLINE 100 MG/1
100 CAPSULE ORAL 2 TIMES DAILY
Qty: 14 CAPSULE | Refills: 0 | Status: SHIPPED | OUTPATIENT
Start: 2018-07-17 | End: 2018-07-24

## 2018-07-17 NOTE — PATIENT INSTRUCTIONS
Understanding Periods  Having a period is a normal, healthy part of becoming and being a woman. A period is the result of a cycle that takes place inside a girls body. This menstrual cycle makes it possible for women to have babies. The cycle begins with the first day of bleeding. In the middle of the cycle, ovulation occurs. This is when an egg is released and begins its journey from the ovary to the uterus.    An egg is released  · During each cycle, 1 egg grows and is released from an ovary. It finds its way to the fallopian tube.  The egg travels through a tube  · The egg moves through the fallopian tube toward the uterus. (If the egg and a mans sperm meet here, a woman becomes pregnant.)  The lining thickens  · The lining of the uterus grows thicker. This lining is made up of blood, tissue, and fluid. (The lining will nourish a growing baby during pregnancy.)  The egg and lining are shed  · About once a month, the egg and the lining of the uterus are shed through the vagina. This is called a period. (A period does not happen during pregnancy.)  Date Last Reviewed: 5/9/2015  © 2714-1074 [employer]. [address] [place] [address]. All rights reserved. This information is not intended as a substitute for professional medical care. Always follow your healthcare professional's instructions.        Vaginal Infection: Bacterial Vaginosis  Both good and bad bacteria are present in a healthy vagina. Bacterial vaginosis (BV) occurs when these bacteria get out of balance. The numbers of good bacteria decrease. This allows the numbers of bad bacteria to increase and cause BV. In most cases, BV is not a serious problem.  Causes of bacterial vaginosis  The cause of BV is not clear. Douching may lead to it. Having sex with a new partner or more than 1 partner makes it more likely.  Symptoms of bacterial vaginosis  Symptoms of BV vary for each woman. Some women have few symptoms or none at all. If  symptoms are present, they can include:  · Thin, milky white or gray or sometimes green discharge  · Unpleasant fishy odor  · Irritation, itching, and burning at opening of vagina which may indicate mixed vaginitis    · Burning or irritation with sex or urination which may indicate mixed vaginitis  Diagnosing bacterial vaginosis  Your healthcare provider will ask about your symptoms and health history. He or she will also do a pelvic exam. This is an exam of your vagina and cervix. A sample of vaginal fluid or discharge may be taken. This sample is checked for signs of BV.  Treating bacterial vaginosis  BV is often treated with antibiotics. They may be given in oral pill form or as a vaginal cream. To use these medicines:  · Be sure to take all of your medicine, even if your symptoms go away.  · If youre taking antibiotic pills, do not drink alcohol until youre finished with all of your medicine.  · If youre using vaginal cream, apply it as directed. Be aware that the cream may make condoms and diaphragms less effective.  · Call your healthcare provider if symptoms do not go away within 4 days of starting treatment. Also call if you have a reaction to the medicine.  Why treatment matters  Even if you have no symptoms or your symptoms are mild, BV should be treated. Untreated BV can lead to health problems such as:  · Increased risk of  delivery if youre pregnant  · Increased risk of complications after surgery on the reproductive organs  · Possible increased risk of pelvic inflammatory disease (PID)   Date Last Reviewed: 3/1/2017  © 9397-1193 [employer]. [address] [place] [address]. All rights reserved. This information is not intended as a substitute for professional medical care. Always follow your healthcare professional's instructions.        Vaginal Infection: Understanding the Vaginal Environment  The vagina is a canal. It connects the uterus (womb) to the outside of  the body. It is home to many types of bacteria and other tiny organisms. These different bacteria most often stay balanced in number. This keeps the vagina healthy. If the balance changes, it can cause infection.   A healthy environment  Many types of bacteria are present in a healthy vagina. When balanced, they dont cause problems. Small amounts of yeast may also be present without causing problems. The most common type of bacteria in the vagina is lactobacillus. It helps keep the vagina at a low pH. A low pH keeps bad bacteria from taking over.  Normal vaginal discharge  The vagina makes fluid. It is sent out as discharge. This also keeps the vagina healthy. Normal discharge can be clear, white, or yellowish. Most women find that normal discharge varies in amount and color through the month.  An unhealthy environment  The vaginal environment may get out of balance. This may result in a vaginal infection. There are a few reasons this can happen. The pH may have changed. The amount of one organism, such as yeast, may increase. Or an outside organism may get into the vagina and throw off the balance:  · Bacterial vaginosis (BV). BV is due to an imbalance in the normal bacteria in the vagina. Lactobacillus bacteria decrease. As a result, the numbers of bad bacteria increase.  · Candidiasis (yeast infection). Yeast is a type of fungus. A yeast infection occurs when yeast cells in the vagina increase. They then attack vaginal tissues. A type of yeast called Candida albicans is often involved.  · Trichomoniasis (trich). Trich is a parasite. It is passed from one person to another during sex. Men with trich often dont have any symptoms. In women, it can take weeks or months before symptoms appear.  Date Last Reviewed: 3/1/2017  © 3313-5217 [employer]. [address] [place] [address], [place] [address]. All rights reserved. This information is not intended as a substitute for professional medical care. Always  follow your healthcare professional's instructions.        Preventing Vaginitis     Use mild, unscented soap when you bathe or shower to avoid irritating your vagina.    Vaginitis is irritation or infection of the vagina or vulva (the outside opening of the vagina). Vaginitis can be caused by bacteria, viruses, parasites, or yeast. Chemicals (such as in perfumes or soaps or in spermicides) can sometimes be a cause. Vaginitis can be caused by hormone changes in pregnancy or with menopause. You can help prevent vaginitis. Follow the tips below. And see your healthcare provider if you have any symptoms.  Hygiene  · Avoid chemicals. Do not use vaginal sprays. Do not use scented toilet paper or tampons that are scented. Sprays and scents have chemicals that can irritate your vagina.  · Do not douche unless you are told to by your healthcare provider. Douching is rarely needed. And it upsets the normal balance in the vagina.  · Wash yourself well. Wash the outer vaginal area (vulva) every day with mild, unscented soap. Keep it as dry as possible.  · Wipe correctly. Make sure to wipe from front to back after a bowel movement. This helps keep from spreading bacteria from your anus to your vagina.  · Change your tampon often. During your period, make sure to change your tampon as often as directed on the package. This allows the normal flow of vaginal discharge and blood.  Lifestyle  · Limit your number of sexual partners. The more partners you have, the greater your risk of infection. Using condoms helps reduce your risk.  · Get enough sleep. Sleep helps keep your bodys immune system healthy. This helps you fight infection.  · Lose weight, if needed. Excess weight can reduce air circulation around your vagina. This can increase your risk of infection.  · Exercise regularly. Regular activity helps keep your body healthy.  · Take antibiotics only as directed. Antibiotics can change the normal chemical balance in the  vagina.    Clothing  · Dont sit in wet clothes. Yeast thrives when its warm and damp.  · Dont wear tight pants. And dont wear tights, leggings, or hose without a cotton crotch. These types of clothing trap warmth and moisture.  · Wear cotton underwear. Cotton lets air circulate around the vagina.  Symptoms of vaginitis  · Irritation, swelling, or itching of the genital area  · Vaginal discharge  · Bad vaginal odor  · Pain or burning during urination   Date Last Reviewed: 12/1/2016  © 1855-3635 [employer]. [address] [place] [address]. All rights reserved. This information is not intended as a substitute for professional medical care. Always follow your healthcare professional's instructions.

## 2018-07-17 NOTE — PROGRESS NOTES
Mary Beth Cain is a 23 y.o. female  presents for numerous issues:  Pain and swelling in vaginal area with intercourse for last year worsened over last few months. States everytime has sexual contact feels like she has ripping and swelling.   Discharge continuous over last year.  Irregular cycles with taking ocp.   Taking ocp daily  Feels like she missed a cycle but upon speaking with pt she had bleeding in July in inactive pills just was light and only had to use one pad a day on - and then restarted ocp on       LMP: Patient's last menstrual period was 2018..      Past Medical History:   Diagnosis Date    Chronic low back pain     Constipation     MgSO4, stool softeners    Environmental allergies     Gastritis     GERD (gastroesophageal reflux disease)     Headache(784.0)      Past Surgical History:   Procedure Laterality Date    TONSILLECTOMY, ADENOIDECTOMY       Social History     Social History    Marital status: Single     Spouse name: N/A    Number of children: N/A    Years of education: N/A     Occupational History    Not on file.     Social History Main Topics    Smoking status: Never Smoker    Smokeless tobacco: Never Used    Alcohol use No    Drug use: No    Sexual activity: Yes     Partners: Male     Other Topics Concern    Not on file     Social History Narrative    Works nights at [employer].        Breakfast: Skips or left overs (crawfish 3x/wk) or eggs; water    Lunch: Skips or left overs or crawfish or Sonic: slushy and manuel and cheeseburger with fries; water    Dinner: Greens, smoked sausage, baked chicken and cornbread or pizza; water    Snacks: Pickles occasional    Eats out: 2x/wk    Water: 12 oz/day     Family History   Problem Relation Age of Onset    Diabetes Maternal Uncle     Diabetes Paternal Uncle     Osteoporosis Mother      OB History      Para Term  AB Living    0 0 0 0 0 0    SAB TAB Ectopic Multiple Live Births    0 0 0 0 0     "      /70   Ht 5' 6" (1.676 m)   Wt 71.2 kg (156 lb 15.5 oz)   LMP 06/14/2018   BMI 25.34 kg/m²       ROS:  Per hpi    PHYSICAL EXAM:  APPEARANCE: Well nourished, well developed, in no acute distress.  AFFECT: WNL, alert and oriented x 3    PELVIC: Normal external genitalia without lesions.  Normal hair distribution.  Adequate perineal body, normal urethral meatus.  Vagina moist and well rugated without lesions, yellowish creamy discharge.  Cervix pink, without lesions, discharge or tenderness.  No significant cystocele or rectocele.  Bimanual exam shows uterus to be normal size, regular, mobile and nontender.  Adnexa without masses or tenderness.    EXTREMITIES: No edema.  Physical Exam    1. Cervicitis  C. trachomatis/N. gonorrhoeae by AMP DNA Cervix    POCT Wet Prep    doxycycline (VIBRAMYCIN) 100 MG Cap    US Pelvis Comp with Transvag NON-OB (xpd   2. BV (bacterial vaginosis)  C. trachomatis/N. gonorrhoeae by AMP DNA Cervix    POCT Wet Prep    doxycycline (VIBRAMYCIN) 100 MG Cap   3. Dyspareunia in female  US Pelvis Comp with Transvag NON-OB (xpd   4. Irregular menses  POCT Urine Pregnancy    AND PLAN:    Wet prep with clue cells and many WBC  Start doxy  Check u/s   Lubrication with intercourse  Keep taking ocp as directed - upt negative in office today  See me back in about 6 weeks                 "

## 2018-07-18 ENCOUNTER — HOSPITAL ENCOUNTER (OUTPATIENT)
Dept: RADIOLOGY | Facility: HOSPITAL | Age: 23
Discharge: HOME | End: 2018-07-18
Attending: NURSE PRACTITIONER
Payer: COMMERCIAL

## 2018-07-18 DIAGNOSIS — N94.10 DYSPAREUNIA IN FEMALE: ICD-10-CM

## 2018-07-18 DIAGNOSIS — N72 CERVICITIS: ICD-10-CM

## 2018-07-18 LAB
BACTERIA UR CULT: NO GROWTH
C TRACH DNA SPEC QL NAA+PROBE: NOT DETECTED
N GONORRHOEA DNA SPEC QL NAA+PROBE: NOT DETECTED

## 2018-07-18 PROCEDURE — 76856 US EXAM PELVIC COMPLETE: CPT | Mod: 26,,, | Performed by: RADIOLOGY

## 2018-07-18 PROCEDURE — 76830 TRANSVAGINAL US NON-OB: CPT | Mod: TC,PO

## 2018-07-18 PROCEDURE — 76830 TRANSVAGINAL US NON-OB: CPT | Mod: 26,,, | Performed by: RADIOLOGY

## 2018-07-31 DIAGNOSIS — Z30.011 ENCOUNTER FOR INITIAL PRESCRIPTION OF CONTRACEPTIVE PILLS: ICD-10-CM

## 2018-07-31 RX ORDER — NORGESTIMATE AND ETHINYL ESTRADIOL 7DAYSX3 28
1 KIT ORAL DAILY
Qty: 28 TABLET | Refills: 1 | Status: SHIPPED | OUTPATIENT
Start: 2018-07-31 | End: 2018-09-26 | Stop reason: SDUPTHER

## 2018-08-28 ENCOUNTER — OFFICE VISIT (OUTPATIENT)
Dept: OBSTETRICS AND GYNECOLOGY | Facility: CLINIC | Age: 23
End: 2018-08-28
Payer: COMMERCIAL

## 2018-08-28 VITALS
HEIGHT: 66 IN | WEIGHT: 155.63 LBS | DIASTOLIC BLOOD PRESSURE: 72 MMHG | SYSTOLIC BLOOD PRESSURE: 112 MMHG | BODY MASS INDEX: 25.01 KG/M2

## 2018-08-28 DIAGNOSIS — B37.31 YEAST VAGINITIS: Primary | ICD-10-CM

## 2018-08-28 PROCEDURE — 87106 FUNGI IDENTIFICATION YEAST: CPT

## 2018-08-28 PROCEDURE — 87210 SMEAR WET MOUNT SALINE/INK: CPT | Mod: QW,S$GLB,, | Performed by: NURSE PRACTITIONER

## 2018-08-28 PROCEDURE — 99214 OFFICE O/P EST MOD 30 MIN: CPT | Mod: S$GLB,,, | Performed by: NURSE PRACTITIONER

## 2018-08-28 PROCEDURE — 87070 CULTURE OTHR SPECIMN AEROBIC: CPT

## 2018-08-28 PROCEDURE — 87491 CHLMYD TRACH DNA AMP PROBE: CPT

## 2018-08-28 PROCEDURE — 99999 PR PBB SHADOW E&M-EST. PATIENT-LVL III: CPT | Mod: PBBFAC,,, | Performed by: NURSE PRACTITIONER

## 2018-08-28 PROCEDURE — 3008F BODY MASS INDEX DOCD: CPT | Mod: CPTII,S$GLB,, | Performed by: NURSE PRACTITIONER

## 2018-08-28 RX ORDER — NYSTATIN AND TRIAMCINOLONE ACETONIDE 100000; 1 [USP'U]/G; MG/G
OINTMENT TOPICAL 2 TIMES DAILY
Qty: 30 G | Refills: 1 | Status: SHIPPED | OUTPATIENT
Start: 2018-08-28 | End: 2018-10-23

## 2018-08-28 RX ORDER — FLUCONAZOLE 150 MG/1
TABLET ORAL
Qty: 2 TABLET | Refills: 1 | Status: SHIPPED | OUTPATIENT
Start: 2018-08-28 | End: 2018-10-23

## 2018-08-28 NOTE — PATIENT INSTRUCTIONS
Vaginal Infection: Yeast (Candidiasis)  Yeast infection occurs when yeast in the vagina increase and attacks the vaginal tissues. Yeast is a type of fungus. These infections are often caused by a type of yeast called Candida albicans. Other species of yeast can also cause infections. Factors that may make infection more likely include recent antibiotic use, douching, or increased sex. Yeast infections are more common in women who have diabetes, or are obese or pregnant, or have a weak immune system.  Symptoms of yeast infection  · Clumpy or thin, white discharge, which may look like cottage cheese  · No odor or minimal odor  · Severe vaginal itching or burning  · Burning with urination  · Swelling, redness of vulva  · Pain during sex  Treating yeast infection  Yeast infection is treated with a vaginal antifungal cream. In some cases, antifungal pills are prescribed instead. During treatment:  · Finish all of your medicine, even if your symptoms go away.  · Apply the cream before going to bed. Lie flat after applying so that it doesn't drip out.  · Do not douche or use tampons.  · Don't rely on a diaphragm or condoms, since the cream may weaken them.  · Avoid intercourse if advised by your healthcare provider.     Should I treat a yeast infection myself?  Discuss with your healthcare provider whether you should use over-the-counter medicines to treat a yeast infection. Self-treatment may depend on whether:  · You've had a yeast infection in the past.  · You're at risk for STDs.  Call your healthcare provider if symptoms do not go away or come back after treatment.   Date Last Reviewed: 3/1/2017  © 4115-2044 [employer]. [address] [place] [address], [place] [address]. All rights reserved. This information is not intended as a substitute for professional medical care. Always follow your healthcare professional's instructions.        Preventing Vaginitis     Use mild, unscented soap when you bathe or shower  to avoid irritating your vagina.    Vaginitis is irritation or infection of the vagina or vulva (the outside opening of the vagina). Vaginitis can be caused by bacteria, viruses, parasites, or yeast. Chemicals (such as in perfumes or soaps or in spermicides) can sometimes be a cause. Vaginitis can be caused by hormone changes in pregnancy or with menopause. You can help prevent vaginitis. Follow the tips below. And see your healthcare provider if you have any symptoms.  Hygiene  · Avoid chemicals. Do not use vaginal sprays. Do not use scented toilet paper or tampons that are scented. Sprays and scents have chemicals that can irritate your vagina.  · Do not douche unless you are told to by your healthcare provider. Douching is rarely needed. And it upsets the normal balance in the vagina.  · Wash yourself well. Wash the outer vaginal area (vulva) every day with mild, unscented soap. Keep it as dry as possible.  · Wipe correctly. Make sure to wipe from front to back after a bowel movement. This helps keep from spreading bacteria from your anus to your vagina.  · Change your tampon often. During your period, make sure to change your tampon as often as directed on the package. This allows the normal flow of vaginal discharge and blood.  Lifestyle  · Limit your number of sexual partners. The more partners you have, the greater your risk of infection. Using condoms helps reduce your risk.  · Get enough sleep. Sleep helps keep your bodys immune system healthy. This helps you fight infection.  · Lose weight, if needed. Excess weight can reduce air circulation around your vagina. This can increase your risk of infection.  · Exercise regularly. Regular activity helps keep your body healthy.  · Take antibiotics only as directed. Antibiotics can change the normal chemical balance in the vagina.    Clothing  · Dont sit in wet clothes. Yeast thrives when its warm and damp.  · Dont wear tight pants. And dont wear tights,  leggings, or hose without a cotton crotch. These types of clothing trap warmth and moisture.  · Wear cotton underwear. Cotton lets air circulate around the vagina.  Symptoms of vaginitis  · Irritation, swelling, or itching of the genital area  · Vaginal discharge  · Bad vaginal odor  · Pain or burning during urination   Date Last Reviewed: 12/1/2016 © 2000-2017 [employer]. [address] [place] [address], [place] [address]. All rights reserved. This information is not intended as a substitute for professional medical care. Always follow your healthcare professional's instructions.        Candida Vaginal Infection    You have a Candida vaginal infection. This is also known as a yeast infection. It is most often caused by a type of yeast (fungus) called Candida. Candida are normally found in the vagina. But if they increase in number, this can lead to infection and cause symptoms.  Symptoms of a yeast infection can include:  · Clumpy or thin, white discharge, which may look like cottage cheese  · Itching or burning  · Burning with urination  Certain factors can make a yeast infection more likely. These can include:  · Taking certain medicines, such as antibiotics or birth control pills  · Pregnancy  · Diabetes  · Weakened immune system  A yeast infection is most often treated with antifungal medicine. This may be given as a vaginal cream or pills you take by mouth. Treatment may last for about 1 to 7 days. Women with severe or recurrent infections may need longer courses of treatment.  Home care  · If youre prescribed medicine, be sure to use it as directed. Finish all of the medicine, even if your symptoms go away. Note: Dont try to treat yourself using over-the-counter products without talking to your provider first. He or she will let you know if this is a good option for you.  · Ask your provider what steps you can take to help reduce your risk of having a yeast infection in the future.  Follow-up  care  Follow up with your healthcare provider, or as directed.  When to seek medical advice  Call your healthcare provider right away if:  · You have a fever of 100.4ºF (38ºC) or higher, or as directed by your provider.  · Your symptoms worsen, or they dont go away within a few days of starting treatment.  · You have new pain in the lower belly or pelvic region.  · You have side effects that bother you or a reaction to the cream or pills youre prescribed.  · You or any partners you have sex with have new symptoms, such as a rash, joint pain, or sores.  Date Last Reviewed: 7/30/2015 © 2000-2017 [employer]. [address] [place] [address], [place] [address]. All rights reserved. This information is not intended as a substitute for professional medical care. Always follow your healthcare professional's instructions.        Vaginal Infection: Understanding the Vaginal Environment  The vagina is a canal. It connects the uterus (womb) to the outside of the body. It is home to many types of bacteria and other tiny organisms. These different bacteria most often stay balanced in number. This keeps the vagina healthy. If the balance changes, it can cause infection.   A healthy environment  Many types of bacteria are present in a healthy vagina. When balanced, they dont cause problems. Small amounts of yeast may also be present without causing problems. The most common type of bacteria in the vagina is lactobacillus. It helps keep the vagina at a low pH. A low pH keeps bad bacteria from taking over.  Normal vaginal discharge  The vagina makes fluid. It is sent out as discharge. This also keeps the vagina healthy. Normal discharge can be clear, white, or yellowish. Most women find that normal discharge varies in amount and color through the month.  An unhealthy environment  The vaginal environment may get out of balance. This may result in a vaginal infection. There are a few reasons this can happen. The pH may have  changed. The amount of one organism, such as yeast, may increase. Or an outside organism may get into the vagina and throw off the balance:  · Bacterial vaginosis (BV). BV is due to an imbalance in the normal bacteria in the vagina. Lactobacillus bacteria decrease. As a result, the numbers of bad bacteria increase.  · Candidiasis (yeast infection). Yeast is a type of fungus. A yeast infection occurs when yeast cells in the vagina increase. They then attack vaginal tissues. A type of yeast called Candida albicans is often involved.  · Trichomoniasis (trich). Trich is a parasite. It is passed from one person to another during sex. Men with trich often dont have any symptoms. In women, it can take weeks or months before symptoms appear.  Date Last Reviewed: 3/1/2017  © 4745-4604 [employer]. [address] [place] [address]. All rights reserved. This information is not intended as a substitute for professional medical care. Always follow your healthcare professional's instructions.        Preventing Vaginal Infection  These steps can help you stay comfortable during treatment of a vaginal infection. They also help prevent vaginal infections in the future.  Keeping a healthy balance  Factors that change the normal balance in the vagina can lead to a vaginal infection. To help keep the balance normal, try these tips:  · Change out of wet bathing suits and damp exercise clothes as soon as possible. Yeast thrive in a warm, moist environment.  · Avoid wearing tight pants. Choose cotton underwear and pantyhose that have a cotton crotch. Cotton keeps you cooler and drier than synthetics.  · Don't douche unless directed by your health care provider. Douching can destroy friendly bacteria and change the vagina's normal balance.  · Wipe from front to back after using the toilet. This prevents bacteria from spreading from the anus to the vulva.  · Wash the vulva with mild, unscented soap or with plain  water.  · Wash your diaphragm, spermicide applicators, and sex toys with mild soap and water after use. Dry them thoroughly before putting them away.  · Change tampons often (every 2 hours to 4 hours). Leaving a tampon in for too long may disrupt the balance of vaginal bacteria.  · Avoid vaginal sprays, scented toilet paper and soaps, and deodorant tampons or pads, which can cause vaginal irritation  Staying healthy overall  Good overall health can help you resist infection. To be healthier:  · Help protect yourself from STDs by using latex condoms for intercourse. Ask your health care provider for more information about safer sex.  · Eat a variety of healthy foods.  · Exercise regularly.  · Get enough rest and sleep.  · Maintain a healthy weight. If you need to lose weight, ask your health care provider for advice on how to start.  Date Last Reviewed: 5/18/2015  © 1552-5256 [employer]. [address] [place] [address], [place] [address]. All rights reserved. This information is not intended as a substitute for professional medical care. Always follow your healthcare professional's instructions.

## 2018-08-28 NOTE — PROGRESS NOTES
"Mary Beth Cain is a 23 y.o. female  presents with complaint of vaginal discharge for 3 days - as her follow up.  She reports itching.  denies odor.  She states the discharge is yellow.      Past Medical History:   Diagnosis Date    Chronic low back pain     Constipation     MgSO4, stool softeners    Environmental allergies     Gastritis     GERD (gastroesophageal reflux disease)     Headache(784.0)     Vitamin D deficiency      Past Surgical History:   Procedure Laterality Date    TONSILLECTOMY, ADENOIDECTOMY       Social History     Tobacco Use    Smoking status: Never Smoker    Smokeless tobacco: Never Used   Substance Use Topics    Alcohol use: No    Drug use: No     Family History   Problem Relation Age of Onset    Diabetes Maternal Uncle     Diabetes Paternal Uncle     Osteoporosis Mother      OB History    Para Term  AB Living   0 0 0 0 0 0   SAB TAB Ectopic Multiple Live Births   0 0 0 0 0             /72   Ht 5' 6" (1.676 m)   Wt 70.6 kg (155 lb 10.3 oz)   LMP 2018   BMI 25.12 kg/m²     ROS:  GENERAL: No fever, chills, fatigability or weight loss.  VULVAR: No pain, no lesions and no itching.  VAGINAL: No relaxation, no itching, no discharge, no abnormal bleeding and no lesions.  ABDOMEN: No abdominal pain. Denies nausea. Denies vomiting. No diarrhea. No constipation  BREAST: Denies pain. No lumps. No discharge.  URINARY: No incontinence, no nocturia, no frequency and no dysuria.  CARDIOVASCULAR: No chest pain. No shortness of breath. No leg cramps.  NEUROLOGICAL: No headaches. No vision changes.    PHYSICAL EXAM:  VULVA: vulvar tenderness , vulvar edema , VAGINA: vaginal discharge - copious, creamy and mucoid, CERVIX: cervical discharge present - copious, creamy and mucoid, DNA probe for chlamydia and GC obtained, UTERUS: uterus is normal size, shape, consistency and nontender, ADNEXA: normal adnexa in size, nontender and no masses, WET MOUNT done - " results: spores, white blood cells    ASSESSMENT and PLAN:  1. Yeast vaginitis  Genital Culture    POCT Wet Prep    C. trachomatis/N. gonorrhoeae by AMP DNA       Patient was counseled today on vaginitis prevention including :  a. avoiding feminine products such as deoderant soaps, body wash, bubble bath, douches, scented toilet paper, deoderant tampons or pads, feminine wipes, chronic pad use, etc.  b. avoiding other vulvovaginal irritants such as long hot baths, humidity, tight, synthetic clothing, chlorine and sitting around in wet bathing suits  c. wearing cotton underwear, avoiding thong underwear and no underwear to bed  d. taking showers instead of baths and use a hair dryer on cool setting afterwards to dry  e. wearing cotton to exercise and shower immediately after exercise and change clothes  f. using polyurethane condoms without spermicide if sexually active and symptoms are triggered by intercourse

## 2018-08-29 ENCOUNTER — PATIENT MESSAGE (OUTPATIENT)
Dept: OBSTETRICS AND GYNECOLOGY | Facility: CLINIC | Age: 23
End: 2018-08-29

## 2018-08-29 LAB
C TRACH DNA SPEC QL NAA+PROBE: NOT DETECTED
N GONORRHOEA DNA SPEC QL NAA+PROBE: NOT DETECTED

## 2018-08-30 ENCOUNTER — PATIENT MESSAGE (OUTPATIENT)
Dept: OBSTETRICS AND GYNECOLOGY | Facility: CLINIC | Age: 23
End: 2018-08-30

## 2018-08-30 LAB — BACTERIA GENITAL AEROBE CULT: NORMAL

## 2018-09-26 ENCOUNTER — TELEPHONE (OUTPATIENT)
Dept: OBSTETRICS AND GYNECOLOGY | Facility: CLINIC | Age: 23
End: 2018-09-26

## 2018-09-26 DIAGNOSIS — Z30.011 ENCOUNTER FOR INITIAL PRESCRIPTION OF CONTRACEPTIVE PILLS: ICD-10-CM

## 2018-09-26 RX ORDER — NORGESTIMATE AND ETHINYL ESTRADIOL 7DAYSX3 28
KIT ORAL
Qty: 28 TABLET | Refills: 1 | Status: SHIPPED | OUTPATIENT
Start: 2018-09-26 | End: 2018-10-23 | Stop reason: SDUPTHER

## 2018-09-26 NOTE — TELEPHONE ENCOUNTER
Please mercedes ww exam --(due after 7/2018) see twice by eliecer Flower, but no wellness or pap done

## 2018-09-26 NOTE — TELEPHONE ENCOUNTER
Spoke to the pt. And she is waiting to sign up for insurance at the end of October.  When she gets insurance she stated she will call back to schedule her pap. neeta Olivas (Routing comment

## 2018-10-23 ENCOUNTER — OFFICE VISIT (OUTPATIENT)
Dept: OBSTETRICS AND GYNECOLOGY | Facility: CLINIC | Age: 23
End: 2018-10-23
Payer: COMMERCIAL

## 2018-10-23 VITALS
HEIGHT: 66 IN | WEIGHT: 159.38 LBS | SYSTOLIC BLOOD PRESSURE: 118 MMHG | DIASTOLIC BLOOD PRESSURE: 74 MMHG | BODY MASS INDEX: 25.61 KG/M2

## 2018-10-23 DIAGNOSIS — Z11.3 SCREENING FOR STD (SEXUALLY TRANSMITTED DISEASE): ICD-10-CM

## 2018-10-23 DIAGNOSIS — Z30.014 ENCOUNTER FOR INITIAL PRESCRIPTION OF INTRAUTERINE CONTRACEPTIVE DEVICE (IUD): ICD-10-CM

## 2018-10-23 DIAGNOSIS — Z01.419 ENCOUNTER FOR GYNECOLOGICAL EXAMINATION WITHOUT ABNORMAL FINDING: Primary | ICD-10-CM

## 2018-10-23 DIAGNOSIS — Z30.41 ENCOUNTER FOR SURVEILLANCE OF CONTRACEPTIVE PILLS: ICD-10-CM

## 2018-10-23 PROCEDURE — 87491 CHLMYD TRACH DNA AMP PROBE: CPT

## 2018-10-23 PROCEDURE — 88175 CYTOPATH C/V AUTO FLUID REDO: CPT

## 2018-10-23 PROCEDURE — 99395 PREV VISIT EST AGE 18-39: CPT | Mod: S$GLB,,, | Performed by: NURSE PRACTITIONER

## 2018-10-23 PROCEDURE — 99999 PR PBB SHADOW E&M-EST. PATIENT-LVL III: CPT | Mod: PBBFAC,,, | Performed by: NURSE PRACTITIONER

## 2018-10-23 RX ORDER — NORGESTIMATE AND ETHINYL ESTRADIOL 7DAYSX3 28
KIT ORAL
Qty: 28 TABLET | Refills: 1 | Status: SHIPPED | OUTPATIENT
Start: 2018-10-23 | End: 2019-02-25

## 2018-10-23 NOTE — PROGRESS NOTES
CC: Well woman exam    Mary Beth Cain is a 23 y.o. female  presents for well woman exam.  LMP: Patient's last menstrual period was 2018 (exact date)..  No issues, problems, or complaints.   Doing well on ocp but would like IUD, teaching done and will order kyleena IUD.      Past Medical History:   Diagnosis Date    Chronic low back pain     Constipation     MgSO4, stool softeners    Environmental allergies     Gastritis     GERD (gastroesophageal reflux disease)     Headache(784.0)     Vitamin D deficiency      Past Surgical History:   Procedure Laterality Date    TONSILLECTOMY, ADENOIDECTOMY       Social History     Socioeconomic History    Marital status: Single     Spouse name: Not on file    Number of children: Not on file    Years of education: Not on file    Highest education level: Not on file   Social Needs    Financial resource strain: Not on file    Food insecurity - worry: Not on file    Food insecurity - inability: Not on file    Transportation needs - medical: Not on file    Transportation needs - non-medical: Not on file   Occupational History    Not on file   Tobacco Use    Smoking status: Never Smoker    Smokeless tobacco: Never Used   Substance and Sexual Activity    Alcohol use: No    Drug use: No    Sexual activity: Yes     Partners: Male   Other Topics Concern    Not on file   Social History Narrative    Works nights at [employer].        Breakfast: Skips or left overs (crawfish 3x/wk) or eggs; water    Lunch: Skips or left overs or crawfish or Sonic: slushy and manuel and cheeseburger with fries; water    Dinner: Greens, smoked sausage, baked chicken and cornbread or pizza; water    Snacks: Pickles occasional    Eats out: 2x/wk    Water: 12 oz/day     Family History   Problem Relation Age of Onset    Diabetes Maternal Uncle     Diabetes Paternal Uncle     Osteoporosis Mother      OB History      Para Term  AB Living    0 0 0 0 0 0    SAB TAB  "Ectopic Multiple Live Births    0 0 0 0 0          /74   Ht 5' 6" (1.676 m)   Wt 72.3 kg (159 lb 6.3 oz)   LMP 09/26/2018 (Exact Date)   BMI 25.73 kg/m²       ROS:  GENERAL: Denies weight gain or weight loss. Feeling well overall.   SKIN: Denies rash or lesions.   HEAD: Denies head injury or headache.   NODES: Denies enlarged lymph nodes.   CHEST: Denies chest pain or shortness of breath.   CARDIOVASCULAR: Denies palpitations or left sided chest pain.   ABDOMEN: No abdominal pain, constipation, diarrhea, nausea, vomiting or rectal bleeding.   URINARY: No frequency, dysuria, hematuria, or burning on urination.  REPRODUCTIVE: See HPI.   BREASTS: The patient performs breast self-examination and denies pain, lumps, or nipple discharge.   HEMATOLOGIC: No easy bruisability or excessive bleeding.   MUSCULOSKELETAL: Denies joint pain or swelling.   NEUROLOGIC: Denies syncope or weakness.   PSYCHIATRIC: Denies depression, anxiety or mood swings.    PHYSICAL EXAM:  APPEARANCE: Well nourished, well developed, in no acute distress.  AFFECT: WNL, alert and oriented x 3  SKIN: No acne or hirsutism  NECK: Neck symmetric without masses or thyromegaly  NODES: No inguinal, cervical, axillary, or femoral lymph node enlargement  CHEST: Good respiratory effect  ABDOMEN: Soft.  No tenderness or masses.  No hepatosplenomegaly.  No hernias.  BREASTS: Symmetrical, no skin changes or visible lesions.  No palpable masses, nipple discharge bilaterally.  PELVIC: Normal external genitalia without lesions.  Normal hair distribution.  Adequate perineal body, normal urethral meatus.  Vagina moist and well rugated without lesions or discharge.  Cervix pink, without lesions, discharge or tenderness.  No significant cystocele or rectocele.  Bimanual exam shows uterus to be normal size, regular, mobile and nontender.  Adnexa without masses or tenderness.    EXTREMITIES: No edema.  Physical Exam    1. Encounter for gynecological examination " without abnormal finding  Liquid-based pap smear, screening   2. Encounter for surveillance of contraceptive pills  norgestimate-ethinyl estradiol (TRI-SPRINTEC, 28,) 0.18/0.215/0.25 mg-35 mcg (28) tablet   3. Encounter for initial prescription of intrauterine contraceptive device (IUD)  levonorgestrel (KYLEENA) 17.5 mcg/24 hr (5 years) IUD   4. Screening for STD (sexually transmitted disease)  C. trachomatis/N. gonorrhoeae by AMP DNA    AND PLAN:    Patient was counseled today on A.C.S. Pap guidelines and recommendations for yearly pelvic exams, mammograms and monthly self breast exams; to see her PCP for other health maintenance.

## 2018-10-24 ENCOUNTER — PATIENT MESSAGE (OUTPATIENT)
Dept: OBSTETRICS AND GYNECOLOGY | Facility: CLINIC | Age: 23
End: 2018-10-24

## 2018-10-24 LAB
C TRACH DNA SPEC QL NAA+PROBE: NOT DETECTED
N GONORRHOEA DNA SPEC QL NAA+PROBE: NOT DETECTED

## 2018-10-29 ENCOUNTER — PATIENT MESSAGE (OUTPATIENT)
Dept: OBSTETRICS AND GYNECOLOGY | Facility: CLINIC | Age: 23
End: 2018-10-29

## 2018-11-12 ENCOUNTER — OFFICE VISIT (OUTPATIENT)
Dept: INTERNAL MEDICINE | Facility: CLINIC | Age: 23
End: 2018-11-12
Payer: COMMERCIAL

## 2018-11-12 VITALS
DIASTOLIC BLOOD PRESSURE: 68 MMHG | OXYGEN SATURATION: 98 % | WEIGHT: 160.06 LBS | HEIGHT: 66 IN | TEMPERATURE: 99 F | BODY MASS INDEX: 25.72 KG/M2 | RESPIRATION RATE: 20 BRPM | SYSTOLIC BLOOD PRESSURE: 110 MMHG | HEART RATE: 88 BPM

## 2018-11-12 DIAGNOSIS — K59.00 CONSTIPATION, UNSPECIFIED CONSTIPATION TYPE: ICD-10-CM

## 2018-11-12 DIAGNOSIS — R60.0 LOCALIZED EDEMA: ICD-10-CM

## 2018-11-12 DIAGNOSIS — E55.9 VITAMIN D DEFICIENCY: Primary | ICD-10-CM

## 2018-11-12 PROBLEM — K21.9 GASTROESOPHAGEAL REFLUX DISEASE: Status: RESOLVED | Noted: 2017-04-10 | Resolved: 2018-11-12

## 2018-11-12 PROCEDURE — 3008F BODY MASS INDEX DOCD: CPT | Mod: CPTII,S$GLB,, | Performed by: INTERNAL MEDICINE

## 2018-11-12 PROCEDURE — 99999 PR PBB SHADOW E&M-EST. PATIENT-LVL III: CPT | Mod: PBBFAC,,, | Performed by: INTERNAL MEDICINE

## 2018-11-12 PROCEDURE — 99213 OFFICE O/P EST LOW 20 MIN: CPT | Mod: S$GLB,,, | Performed by: INTERNAL MEDICINE

## 2018-11-12 NOTE — PROGRESS NOTES
Subjective:      Patient ID: Mary Beth Cain is a 23 y.o. female.    Chief Complaint: Follow-up      HPI     Ms. Mary Beth Cain is a patient of Sterling Chow MD, who presents for FU on osteopenia by x-ray. She reports taking vitamin D 2,000 IU daily over the past 3 months.     She notes having joint pains in her knees and lower back, which seems to have worsened since switching from Depo-Provera to Tri-Sprintec.    She notes having a FHx of osteopenia and OA.      Past Medical History:   Diagnosis Date    Chronic low back pain     Constipation     MgSO4, stool softeners    Environmental allergies     Gastritis     GERD (gastroesophageal reflux disease)     Headache(784.0)     Osteopenia determined by x-ray 2017    Vitamin D deficiency      Past Surgical History:   Procedure Laterality Date    TONSILLECTOMY, ADENOIDECTOMY       Social History     Socioeconomic History    Marital status: Single     Spouse name: Not on file    Number of children: Not on file    Years of education: Not on file    Highest education level: Not on file   Social Needs    Financial resource strain: Not on file    Food insecurity - worry: Not on file    Food insecurity - inability: Not on file    Transportation needs - medical: Not on file    Transportation needs - non-medical: Not on file   Occupational History    Not on file   Tobacco Use    Smoking status: Never Smoker    Smokeless tobacco: Never Used   Substance and Sexual Activity    Alcohol use: No    Drug use: No    Sexual activity: Yes     Partners: Male   Other Topics Concern    Not on file   Social History Narrative    Works nights at [employer].        Breakfast: 2 strips manuel, 2 eggs, Frosted Flakes w/ 2% milk or [employer] pancakes & 2 eggs (previously crawfish 3x/wk); water    Lunch: Left overs or home cooked steak or liver or chicken (previously Sonic: slushy and manuel and cheeseburger with fries); water    Dinner: Greens, smoked sausage, baked chicken &  "cornbread or pizza; water or cranberry juice    Snacks: Pickles occasional    Eats out: 1x/wk (prev 2x/wk)    Water: 12 oz/day     Family History   Problem Relation Age of Onset    Diabetes Maternal Uncle     Diabetes Paternal Uncle     Osteoporosis Mother        Current Outpatient Medications:     norgestimate-ethinyl estradiol (TRI-SPRINTEC, 28,) 0.18/0.215/0.25 mg-35 mcg (28) tablet, TAKE 1 TABLET BY MOUTH ONCE DAILY (NEED  APPT), Disp: 28 tablet, Rfl: 1    polyethylene glycol (GLYCOLAX) 17 gram PwPk, Take by mouth., Disp: , Rfl:     ranitidine (ZANTAC) 300 MG tablet, Take 1 tablet (300 mg total) by mouth 2 (two) times daily as needed for Heartburn., Disp: 60 tablet, Rfl: 11    levonorgestrel (KYLEENA) 17.5 mcg/24 hr (5 years) IUD, 1 Intra Uterine Device (17.5 mcg total) by Intrauterine route once. for 1 dose, Disp: 1 Intra Uterine Device, Rfl: 0    Review of patient's allergies indicates:   Allergen Reactions    Mite extract     Strawberry Swelling     Face and throat        Review of Systems   All remaining systems negative    Objective:     /68 (BP Location: Left arm, Patient Position: Sitting, BP Method: Medium (Manual))   Pulse 88   Temp 98.9 °F (37.2 °C) (Tympanic)   Resp 20   Ht 5' 6" (1.676 m)   Wt 72.6 kg (160 lb 0.9 oz)   SpO2 98%   BMI 25.83 kg/m²     Physical Exam  GEN: A&O fully, NAD  PSYC: Normal affect  HEENT: OP: Clear, no LAD, no thyroid masses, auditory canals and TMs WNL  CV: RRR, no M/G/R  PULM: CTA bilaterally, no wheezes, rales, crackles   GI: S/NT/ND, normal bowel sounds  EXT: No C/C; mild non-pitting edema in fingers      Lab Results   Component Value Date    WBC 5.82 07/16/2018    HGB 12.5 07/16/2018    HCT 37.8 07/16/2018     07/16/2018    CHOL 160 07/16/2018    TRIG 51 07/16/2018    HDL 69 07/16/2018    LDLCALC 80.8 07/16/2018    ALT 12 07/16/2018    AST 18 07/16/2018     07/16/2018    K 4.0 07/16/2018     07/16/2018    CREATININE 0.9 07/16/2018 "    BUN 13 07/16/2018    CO2 23 07/16/2018    CALCIUM 10.3 07/16/2018       Assessment:      1. Vitamin D deficiency: Improving slowly, but surely. Continue current regimen.    2. Localized edema: MIld. Encouraged to avoid all foods/drinks >250 mg sodium/serving and to avoid meat and canned goods.   3. Constipation, unspecified constipation type: Likely 2/2 high meat intake. Encouraged to replace with un-fried seafood.        Plan:   Vitamin D deficiency    Localized edema    Constipation, unspecified constipation type        Follow-up in about 7 months (around 6/12/2019), or if symptoms worsen or fail to improve, for Annual.    I spent 25 minutes of time with patient 50% or more of which was discussing labs and plans of care.

## 2018-11-21 DIAGNOSIS — Z30.011 ENCOUNTER FOR INITIAL PRESCRIPTION OF CONTRACEPTIVE PILLS: ICD-10-CM

## 2018-11-22 RX ORDER — NORGESTIMATE AND ETHINYL ESTRADIOL 7DAYSX3 28
KIT ORAL
Qty: 28 TABLET | Refills: 11 | Status: SHIPPED | OUTPATIENT
Start: 2018-11-22 | End: 2019-02-25

## 2018-11-26 ENCOUNTER — TELEPHONE (OUTPATIENT)
Dept: OBSTETRICS AND GYNECOLOGY | Facility: CLINIC | Age: 23
End: 2018-11-26

## 2018-11-27 DIAGNOSIS — K21.9 GASTROESOPHAGEAL REFLUX DISEASE, ESOPHAGITIS PRESENCE NOT SPECIFIED: ICD-10-CM

## 2018-12-18 ENCOUNTER — PATIENT MESSAGE (OUTPATIENT)
Dept: OBSTETRICS AND GYNECOLOGY | Facility: CLINIC | Age: 23
End: 2018-12-18

## 2018-12-19 NOTE — TELEPHONE ENCOUNTER
One more question if a patient wanted to buy the device and pay out of pocket how would we go about getting that ordered for her and what would be the price ?

## 2018-12-19 NOTE — TELEPHONE ENCOUNTER
Johana was just checking the status in this patiens paco, can you assist or is there a number I can contact ? ... flori       Thanks   [phone]

## 2018-12-20 ENCOUNTER — TELEPHONE (OUTPATIENT)
Dept: OBSTETRICS AND GYNECOLOGY | Facility: CLINIC | Age: 23
End: 2018-12-20

## 2018-12-20 NOTE — TELEPHONE ENCOUNTER
----- Message from Sanjuana Payne sent at 12/20/2018  8:42 AM CST -----  Contact: Patient  Patient states she is on her way to fill out paperwork for birthcontrol, please call her back if needed at [phone]. Thank you

## 2018-12-21 ENCOUNTER — TELEPHONE (OUTPATIENT)
Dept: OBSTETRICS AND GYNECOLOGY | Facility: CLINIC | Age: 23
End: 2018-12-21

## 2018-12-21 NOTE — TELEPHONE ENCOUNTER
----- Message from Sanjuana Payne sent at 12/21/2018 10:31 AM CST -----  Contact: Star Bliss  Ms Bliss states the Nexplanon for was missing some information on it and she needs it to complete order, she needs add provider NPI, patients phone, provider needs to sign and date the bottom line, she cannot take a verbal, please fax to  [phone].  Please call her back if needed at [phone] ext [phone]. Thank you

## 2019-01-22 ENCOUNTER — PATIENT MESSAGE (OUTPATIENT)
Dept: OBSTETRICS AND GYNECOLOGY | Facility: CLINIC | Age: 24
End: 2019-01-22

## 2019-01-22 ENCOUNTER — TELEPHONE (OUTPATIENT)
Dept: OBSTETRICS AND GYNECOLOGY | Facility: CLINIC | Age: 24
End: 2019-01-22

## 2019-02-05 ENCOUNTER — PATIENT MESSAGE (OUTPATIENT)
Dept: INTERNAL MEDICINE | Facility: CLINIC | Age: 24
End: 2019-02-05

## 2019-02-12 ENCOUNTER — PATIENT MESSAGE (OUTPATIENT)
Dept: OBSTETRICS AND GYNECOLOGY | Facility: CLINIC | Age: 24
End: 2019-02-12

## 2019-02-25 ENCOUNTER — PROCEDURE VISIT (OUTPATIENT)
Dept: OBSTETRICS AND GYNECOLOGY | Facility: CLINIC | Age: 24
End: 2019-02-25
Payer: COMMERCIAL

## 2019-02-25 VITALS
SYSTOLIC BLOOD PRESSURE: 118 MMHG | BODY MASS INDEX: 25.37 KG/M2 | WEIGHT: 157.88 LBS | HEIGHT: 66 IN | DIASTOLIC BLOOD PRESSURE: 62 MMHG

## 2019-02-25 DIAGNOSIS — Z30.017 NEXPLANON INSERTION: Primary | ICD-10-CM

## 2019-02-25 PROCEDURE — 81025 PR  URINE PREGNANCY TEST: ICD-10-PCS | Mod: S$GLB,,, | Performed by: NURSE PRACTITIONER

## 2019-02-25 PROCEDURE — 11981 INSERTION OF NEXPLANON: ICD-10-PCS | Mod: S$GLB,,, | Performed by: NURSE PRACTITIONER

## 2019-02-25 PROCEDURE — 11981 INSERTION DRUG DLVR IMPLANT: CPT | Mod: S$GLB,,, | Performed by: NURSE PRACTITIONER

## 2019-02-25 PROCEDURE — 81025 URINE PREGNANCY TEST: CPT | Mod: S$GLB,,, | Performed by: NURSE PRACTITIONER

## 2019-02-25 NOTE — PROCEDURES
Insertion of Nexplanon  Date/Time: 2/25/2019 10:07 AM  Performed by: Lillie Herrera NP  Authorized by: Lillie Herrera NP     Consent obtained:  Written  Consent given by:  Patient  Patient questions answered: yes    Patient agrees, verbalizes understanding, and wants to proceed: yes    Educational handouts given: yes    Instructions and paperwork completed: yes    Pre-procedure timeout performed: yes    Prepped with: povidone-iodine    Local anesthetic:  Lidocaine without epinephrine  The site was cleaned and prepped in a sterile fashion: yes    Small stab incision was made in arm: yes    Left/right:  Left   68 mg etonogestrel 68 mg  Preloaded Implanon trocar was placed subdermally: yes    Visualization of implant was obtained: yes    Nexplanon was inserted and trocar removed: yes    Visualization of notch in stilette and palpitation of device: yes    Palpitation confirms placement by provider and patient: yes    Site was closed with steri-strips and pressure bandage applied: yes

## 2019-03-07 ENCOUNTER — TELEPHONE (OUTPATIENT)
Dept: INTERNAL MEDICINE | Facility: CLINIC | Age: 24
End: 2019-03-07

## 2019-03-07 NOTE — TELEPHONE ENCOUNTER
----- Message from Campbell Fisher sent at 3/7/2019 11:56 AM CST -----  Contact: osito Zuñiga/ [facility]  He's calling in regards to the RX request for the Ranitidine medication he states the fax did not include the qty, directions, strength and the number of refills, he needs to verify if the Rx has been approved, pls ge, [phone] (fax)/ # [phone], pls ref# [id number]

## 2019-03-11 ENCOUNTER — OFFICE VISIT (OUTPATIENT)
Dept: OBSTETRICS AND GYNECOLOGY | Facility: CLINIC | Age: 24
End: 2019-03-11
Payer: COMMERCIAL

## 2019-03-11 VITALS
DIASTOLIC BLOOD PRESSURE: 62 MMHG | SYSTOLIC BLOOD PRESSURE: 110 MMHG | BODY MASS INDEX: 25.26 KG/M2 | WEIGHT: 157.19 LBS | HEIGHT: 66 IN

## 2019-03-11 DIAGNOSIS — Z97.5 NEXPLANON IN PLACE: Primary | ICD-10-CM

## 2019-03-11 PROCEDURE — 3008F PR BODY MASS INDEX (BMI) DOCUMENTED: ICD-10-PCS | Mod: CPTII,S$GLB,, | Performed by: NURSE PRACTITIONER

## 2019-03-11 PROCEDURE — 99999 PR PBB SHADOW E&M-EST. PATIENT-LVL III: CPT | Mod: PBBFAC,,, | Performed by: NURSE PRACTITIONER

## 2019-03-11 PROCEDURE — 3008F BODY MASS INDEX DOCD: CPT | Mod: CPTII,S$GLB,, | Performed by: NURSE PRACTITIONER

## 2019-03-11 PROCEDURE — 99213 OFFICE O/P EST LOW 20 MIN: CPT | Mod: S$GLB,,, | Performed by: NURSE PRACTITIONER

## 2019-03-11 PROCEDURE — 99213 PR OFFICE/OUTPT VISIT, EST, LEVL III, 20-29 MIN: ICD-10-PCS | Mod: S$GLB,,, | Performed by: NURSE PRACTITIONER

## 2019-03-11 PROCEDURE — 99999 PR PBB SHADOW E&M-EST. PATIENT-LVL III: ICD-10-PCS | Mod: PBBFAC,,, | Performed by: NURSE PRACTITIONER

## 2019-03-11 NOTE — PROGRESS NOTES
Mary Beth Cain is a 23 y.o. female  presents for recheck of arm from nexplanon insertion on 19 - doing well no issues.   LMP: Patient's last menstrual period was 2019..  No issues, problems, or complaints.      Past Medical History:   Diagnosis Date    Chronic low back pain     Constipation     MgSO4, stool softeners    Environmental allergies     Gastritis     GERD (gastroesophageal reflux disease)     Headache(784.0)     Osteopenia determined by x-ray 2017    Vitamin D deficiency      Past Surgical History:   Procedure Laterality Date    TONSILLECTOMY, ADENOIDECTOMY       Social History     Socioeconomic History    Marital status: Single     Spouse name: Not on file    Number of children: Not on file    Years of education: Not on file    Highest education level: Not on file   Social Needs    Financial resource strain: Not on file    Food insecurity - worry: Not on file    Food insecurity - inability: Not on file    Transportation needs - medical: Not on file    Transportation needs - non-medical: Not on file   Occupational History    Not on file   Tobacco Use    Smoking status: Never Smoker    Smokeless tobacco: Never Used   Substance and Sexual Activity    Alcohol use: No    Drug use: No    Sexual activity: Yes     Partners: Male     Birth control/protection: Implant   Other Topics Concern    Not on file   Social History Narrative    Works nights at [employer].        Breakfast: 2 strips manuel, 2 eggs, Frosted Flakes w/ 2% milk or McDonalds pancakes & 2 eggs (previously crawfish 3x/wk); water    Lunch: Left overs or home cooked steak or liver or chicken (previously Sonic: slushy and manuel and cheeseburger with fries); water    Dinner: Greens, smoked sausage, baked chicken & cornbread or pizza; water or cranberry juice    Snacks: Pickles occasional    Eats out: 1x/wk (prev 2x/wk)    Water: 12 oz/day     Family History   Problem Relation Age of Onset    Diabetes Maternal  "Uncle     Diabetes Paternal Uncle     Osteoporosis Mother      OB History      Para Term  AB Living    0 0 0 0 0 0    SAB TAB Ectopic Multiple Live Births    0 0 0 0 0          /62   Ht 5' 6" (1.676 m)   Wt 71.3 kg (157 lb 3 oz)   LMP 2019   BMI 25.37 kg/m²       ROS:  GENERAL: Denies weight gain or weight loss. Feeling well overall.   SKIN: Denies rash or lesions.   HEAD: Denies head injury or headache.   NODES: Denies enlarged lymph nodes.   CHEST: Denies chest pain or shortness of breath.   CARDIOVASCULAR: Denies palpitations or left sided chest pain.   ABDOMEN: No abdominal pain, constipation, diarrhea, nausea, vomiting or rectal bleeding.   URINARY: No frequency, dysuria, hematuria, or burning on urination.  REPRODUCTIVE: See HPI.   BREASTS: The patient performs breast self-examination and denies pain, lumps, or nipple discharge.   HEMATOLOGIC: No easy bruisability or excessive bleeding.   MUSCULOSKELETAL: Denies joint pain or swelling.   NEUROLOGIC: Denies syncope or weakness.   PSYCHIATRIC: Denies depression, anxiety or mood swings.    PHYSICAL EXAM:  APPEARANCE: Well nourished, well developed, in no acute distress.  AFFECT: WNL, alert and oriented x 3  SKIN: No acne or hirsutism, healed well at insertion site  Physical Exam    1. Nexplanon in place      AND PLAN:    Patient was counseled today on A.C.S. Pap guidelines and recommendations for yearly pelvic exams, mammograms and monthly self breast exams; to see her PCP for other health maintenance.     See back for annual end of October beginning               "

## 2019-03-22 DIAGNOSIS — K21.9 GASTROESOPHAGEAL REFLUX DISEASE, ESOPHAGITIS PRESENCE NOT SPECIFIED: ICD-10-CM

## 2019-03-22 NOTE — TELEPHONE ENCOUNTER
----- Message from Oren Rey sent at 3/22/2019  4:09 PM CDT -----  Contact: Pt  ..Type:  RX Refill Request    Who Called:  Pt  Refill or New Rx: Refill   RX Name and Strength: Rantidine   How is the patient currently taking it? (ex. 1XDay): daily   Is this a 30 day or 90 day RX:90 day  Preferred Pharmacy with phone number:  Local or Mail Order: .  [facility]: [phone]  Ordering Provider: Claudine  Would the patient rather a call back or a response via [facility]? Call back  Best Call Back Number: .[phone] (home)   Additional Information:

## 2019-05-07 ENCOUNTER — OFFICE VISIT (OUTPATIENT)
Dept: INTERNAL MEDICINE | Facility: CLINIC | Age: 24
End: 2019-05-07
Payer: COMMERCIAL

## 2019-05-07 VITALS
BODY MASS INDEX: 25.37 KG/M2 | SYSTOLIC BLOOD PRESSURE: 133 MMHG | DIASTOLIC BLOOD PRESSURE: 74 MMHG | HEART RATE: 69 BPM | HEIGHT: 67 IN | WEIGHT: 161.63 LBS | TEMPERATURE: 97 F

## 2019-05-07 DIAGNOSIS — K29.70 GASTRITIS WITHOUT BLEEDING, UNSPECIFIED CHRONICITY, UNSPECIFIED GASTRITIS TYPE: Primary | ICD-10-CM

## 2019-05-07 DIAGNOSIS — M25.561 ACUTE PAIN OF RIGHT KNEE: ICD-10-CM

## 2019-05-07 PROCEDURE — 99999 PR PBB SHADOW E&M-EST. PATIENT-LVL III: CPT | Mod: PBBFAC,,, | Performed by: INTERNAL MEDICINE

## 2019-05-07 PROCEDURE — 3008F BODY MASS INDEX DOCD: CPT | Mod: CPTII,S$GLB,, | Performed by: INTERNAL MEDICINE

## 2019-05-07 PROCEDURE — 3008F PR BODY MASS INDEX (BMI) DOCUMENTED: ICD-10-PCS | Mod: CPTII,S$GLB,, | Performed by: INTERNAL MEDICINE

## 2019-05-07 PROCEDURE — 99213 PR OFFICE/OUTPT VISIT, EST, LEVL III, 20-29 MIN: ICD-10-PCS | Mod: S$GLB,,, | Performed by: INTERNAL MEDICINE

## 2019-05-07 PROCEDURE — 99999 PR PBB SHADOW E&M-EST. PATIENT-LVL III: ICD-10-PCS | Mod: PBBFAC,,, | Performed by: INTERNAL MEDICINE

## 2019-05-07 PROCEDURE — 99213 OFFICE O/P EST LOW 20 MIN: CPT | Mod: S$GLB,,, | Performed by: INTERNAL MEDICINE

## 2019-05-07 NOTE — PROGRESS NOTES
Subjective:      Patient ID: Mary Beth Cain is a 23 y.o. female.    Chief Complaint: Follow-up      HPI     Ms. Mary Beth Cain is a patient of Sterling Chow MD, who presents for Follow-up    She reports increased GERD, for which she has to take ranitidine qd.    Past Medical History:   Diagnosis Date    Chronic low back pain     Constipation     MgSO4, stool softeners    Environmental allergies     Gastritis     GERD (gastroesophageal reflux disease)     Headache(784.0)     Osteopenia determined by x-ray 2017    Vitamin D deficiency      Past Surgical History:   Procedure Laterality Date    TONSILLECTOMY, ADENOIDECTOMY       Social History     Socioeconomic History    Marital status: Single     Spouse name: Not on file    Number of children: Not on file    Years of education: Not on file    Highest education level: Not on file   Occupational History    Not on file   Social Needs    Financial resource strain: Not on file    Food insecurity:     Worry: Not on file     Inability: Not on file    Transportation needs:     Medical: Not on file     Non-medical: Not on file   Tobacco Use    Smoking status: Never Smoker    Smokeless tobacco: Never Used   Substance and Sexual Activity    Alcohol use: No    Drug use: No    Sexual activity: Yes     Partners: Male     Birth control/protection: Implant   Lifestyle    Physical activity:     Days per week: Not on file     Minutes per session: Not on file    Stress: Not on file   Relationships    Social connections:     Talks on phone: Not on file     Gets together: Not on file     Attends [employer] service: Not on file     Active member of club or organization: Not on file     Attends meetings of clubs or organizations: Not on file     Relationship status: Not on file   Other Topics Concern    Not on file   Social History Narrative    Works nights at [employer].        Breakfast: 2 strips manuel, 2 eggs, Frosted Flakes w/ 2% milk or McDonalds pancakes & 2  "eggs (previously crawfish 3x/wk); water    Lunch: Left overs or home cooked steak or liver or chicken (previously Sonic: slushy and manuel and cheeseburger with fries); water    Dinner: Greens, smoked sausage, baked chicken & cornbread or pizza; water or cranberry juice    Snacks: Pickles occasional    Eats out: 1x/wk (prev 2x/wk)    Water: 12 oz/day     Family History   Problem Relation Age of Onset    Diabetes Maternal Uncle     Diabetes Paternal Uncle     Osteoporosis Mother        Current Outpatient Medications:     ranitidine (ZANTAC) 300 MG tablet, Take 1 tablet (300 mg total) by mouth 2 (two) times daily as needed for Heartburn., Disp: 120 tablet, Rfl: 3    Current Facility-Administered Medications:     etonogestrel Impl 68 mg, 68 mg, Implant, , Lillie YINKA Herrera, NP, 68 mg at 19 1007    Review of patient's allergies indicates:   Allergen Reactions    Strawberry Anaphylaxis and Swelling     Face and throat    Mite extract Swelling        Review of Systems   All remaining systems negative    Objective:     /74 (BP Location: Left arm, Patient Position: Sitting, BP Method: Medium (Manual))   Pulse 69   Temp 96.7 °F (35.9 °C) (Tympanic)   Ht 5' 7" (1.702 m)   Wt 73.3 kg (161 lb 9.6 oz)   BMI 25.31 kg/m²     Physical Exam  GEN: A&O fully, NAD  PSYC: Normal affect      Lab Results   Component Value Date    WBC 5.82 2018    HGB 12.5 2018    HCT 37.8 2018     2018    CHOL 160 2018    TRIG 51 2018    HDL 69 2018    LDLCALC 80.8 2018    ALT 12 2018    AST 18 2018     2018    K 4.0 2018     2018    CREATININE 0.9 2018    BUN 13 2018    CO2 23 2018    CALCIUM 10.3 2018       Assessment:      1. Gastritis without bleeding, unspecified chronicity, unspecified gastritis type: Recommended avoidin. Tomato sauces i.e. Spaghetti, pizza, lasagna, etc.            2. Large or " late meals            3. Caffeine i.e. Soda, coffee, chocolate, tea, etc.            4. Spicy foods            5. Alcoholic beverages            6. Spicy herbs i.e. peppermint, spearmint, etc.            7. Excessive calories    I also recommended increasing water intake to 6-8 glasses/day and foods high in fiber i.e. whole apples while supplementing Zantac (ranitidine) 150 mg by mouth twice daily as needed.     2.      Right knee pain: Resolved. Continue turmeric 1000 mg 1 po qd.       Plan:   Gastritis without bleeding, unspecified chronicity, unspecified gastritis type    Acute pain of right knee        Follow up in about 6 months (around 11/7/2019), or if symptoms worsen or fail to improve, for FU on knee pain and GERD.    I spent 25 minutes of time with patient 50% or more of which was discussing labs and plans of care.

## 2019-10-24 ENCOUNTER — OFFICE VISIT (OUTPATIENT)
Dept: OBSTETRICS AND GYNECOLOGY | Facility: CLINIC | Age: 24
End: 2019-10-24
Payer: COMMERCIAL

## 2019-10-24 ENCOUNTER — LAB VISIT (OUTPATIENT)
Dept: LAB | Facility: HOSPITAL | Age: 24
End: 2019-10-24
Attending: NURSE PRACTITIONER
Payer: COMMERCIAL

## 2019-10-24 VITALS
BODY MASS INDEX: 24.53 KG/M2 | SYSTOLIC BLOOD PRESSURE: 112 MMHG | HEIGHT: 67 IN | DIASTOLIC BLOOD PRESSURE: 76 MMHG | WEIGHT: 156.31 LBS

## 2019-10-24 DIAGNOSIS — Z11.3 SCREENING FOR STD (SEXUALLY TRANSMITTED DISEASE): ICD-10-CM

## 2019-10-24 DIAGNOSIS — Z97.5 NEXPLANON IN PLACE: ICD-10-CM

## 2019-10-24 DIAGNOSIS — Z01.419 ENCOUNTER FOR GYNECOLOGICAL EXAMINATION WITHOUT ABNORMAL FINDING: Primary | ICD-10-CM

## 2019-10-24 LAB
C TRACH DNA SPEC QL NAA+PROBE: DETECTED
N GONORRHOEA DNA SPEC QL NAA+PROBE: NOT DETECTED

## 2019-10-24 PROCEDURE — 80074 ACUTE HEPATITIS PANEL: CPT

## 2019-10-24 PROCEDURE — 99395 PR PREVENTIVE VISIT,EST,18-39: ICD-10-PCS | Mod: S$GLB,,, | Performed by: NURSE PRACTITIONER

## 2019-10-24 PROCEDURE — 86592 SYPHILIS TEST NON-TREP QUAL: CPT

## 2019-10-24 PROCEDURE — 99999 PR PBB SHADOW E&M-EST. PATIENT-LVL III: CPT | Mod: PBBFAC,,, | Performed by: NURSE PRACTITIONER

## 2019-10-24 PROCEDURE — 87491 CHLMYD TRACH DNA AMP PROBE: CPT

## 2019-10-24 PROCEDURE — 99999 PR PBB SHADOW E&M-EST. PATIENT-LVL III: ICD-10-PCS | Mod: PBBFAC,,, | Performed by: NURSE PRACTITIONER

## 2019-10-24 PROCEDURE — 99395 PREV VISIT EST AGE 18-39: CPT | Mod: S$GLB,,, | Performed by: NURSE PRACTITIONER

## 2019-10-24 PROCEDURE — 86703 HIV-1/HIV-2 1 RESULT ANTBDY: CPT

## 2019-10-24 PROCEDURE — 36415 COLL VENOUS BLD VENIPUNCTURE: CPT

## 2019-10-24 NOTE — PROGRESS NOTES
CC: Well woman exam    Mary Beth Cain is a 24 y.o. female  presents for well woman exam.  LMP: Patient's last menstrual period was 2019 (exact date)..  No issues, problems, or complaints.  Doing well with nexplanon.       Past Medical History:   Diagnosis Date    Chronic low back pain     Constipation     MgSO4, stool softeners    Environmental allergies     Gastritis     GERD (gastroesophageal reflux disease)     Headache(784.0)     Osteopenia determined by x-ray 2017    Vitamin D deficiency      Past Surgical History:   Procedure Laterality Date    TONSILLECTOMY, ADENOIDECTOMY       Social History     Socioeconomic History    Marital status: Single     Spouse name: Not on file    Number of children: Not on file    Years of education: Not on file    Highest education level: Not on file   Occupational History    Not on file   Social Needs    Financial resource strain: Not on file    Food insecurity:     Worry: Not on file     Inability: Not on file    Transportation needs:     Medical: Not on file     Non-medical: Not on file   Tobacco Use    Smoking status: Never Smoker    Smokeless tobacco: Never Used   Substance and Sexual Activity    Alcohol use: No    Drug use: No    Sexual activity: Yes     Partners: Male     Birth control/protection: Implant   Lifestyle    Physical activity:     Days per week: Not on file     Minutes per session: Not on file    Stress: Not on file   Relationships    Social connections:     Talks on phone: Not on file     Gets together: Not on file     Attends [employer]regation service: Not on file     Active member of club or organization: Not on file     Attends meetings of clubs or organizations: Not on file     Relationship status: Not on file   Other Topics Concern    Not on file   Social History Narrative    Works nights at [employer].        Breakfast: 2 strips manuel, 2 eggs, Frosted Flakes w/ 2% milk or McDonalds pancakes & 2 eggs (previously crawfish 3x/wk);  "water    Lunch: Left overs or home cooked steak or liver or chicken (previously Sonic: slushy and manuel and cheeseburger with fries); water    Dinner: Greens, smoked sausage, baked chicken & cornbread or pizza; water or cranberry juice    Snacks: Pickles occasional    Eats out: 1x/wk (prev 2x/wk)    Water: 12 oz/day     Family History   Problem Relation Age of Onset    Diabetes Maternal Uncle     Diabetes Paternal Uncle     Osteoporosis Mother     Ovarian cysts Mother      OB History        0    Para   0    Term   0       0    AB   0    Living   0       SAB   0    TAB   0    Ectopic   0    Multiple   0    Live Births   0                 /76 (BP Location: Left arm, Patient Position: Sitting, BP Method: Medium (Manual))   Ht 5' 7" (1.702 m)   Wt 70.9 kg (156 lb 4.9 oz)   LMP 2019 (Exact Date) Comment: No period for september   BMI 24.48 kg/m²       ROS:  GENERAL: Denies weight gain or weight loss. Feeling well overall.   SKIN: Denies rash or lesions.   HEAD: Denies head injury or headache.   NODES: Denies enlarged lymph nodes.   CHEST: Denies chest pain or shortness of breath.   CARDIOVASCULAR: Denies palpitations or left sided chest pain.   ABDOMEN: No abdominal pain, constipation, diarrhea, nausea, vomiting or rectal bleeding.   URINARY: No frequency, dysuria, hematuria, or burning on urination.  REPRODUCTIVE: See HPI.   BREASTS: The patient performs breast self-examination and denies pain, lumps, or nipple discharge.   HEMATOLOGIC: No easy bruisability or excessive bleeding.   MUSCULOSKELETAL: Denies joint pain or swelling.   NEUROLOGIC: Denies syncope or weakness.   PSYCHIATRIC: Denies depression, anxiety or mood swings.    PHYSICAL EXAM:  APPEARANCE: Well nourished, well developed, in no acute distress.  AFFECT: WNL, alert and oriented x 3  SKIN: No acne or hirsutism. nexplanon palpated and in place  NECK: Neck symmetric without masses or thyromegaly  NODES: No inguinal, " cervical, axillary, or femoral lymph node enlargement  CHEST: Good respiratory effect  ABDOMEN: Soft.  No tenderness or masses.  No hepatosplenomegaly.  No hernias.  BREASTS: Symmetrical, no skin changes or visible lesions.  No palpable masses, nipple discharge bilaterally.  PELVIC: Normal external genitalia without lesions.  Normal hair distribution.  Adequate perineal body, normal urethral meatus.  Vagina moist and well rugated without lesions or discharge.  Cervix pink, without lesions, discharge or tenderness.  No significant cystocele or rectocele.  Bimanual exam shows uterus to be normal size, regular, mobile and nontender.  Adnexa without masses or tenderness.    EXTREMITIES: No edema.  Physical Exam    1. Encounter for gynecological examination without abnormal finding     2. Nexplanon in place     3. Screening for STD (sexually transmitted disease)  HIV 1/2 Ag/Ab (4th Gen)    Hepatitis panel, acute    RPR    C. trachomatis/N. gonorrhoeae by AMP DNA    AND PLAN:    Patient was counseled today on A.C.S. Pap guidelines and recommendations for yearly pelvic exams, mammograms and monthly self breast exams; to see her PCP for other health maintenance.

## 2019-10-24 NOTE — PATIENT INSTRUCTIONS
Birth Control: Time-Release Hormones     Time-release hormones require a doctor's prescription.     Certain hormones can help prevent pregnancy. Hormones like the ones used in birth control pills can be taken in other forms. These must be prescribed by your healthcare provider. Because theres very little for you to do, you may find one of these methods easier to stick to than pills. Side effects for this method will vary depending on the type of time-release hormone you use. Talk to your healthcare provider for more information.  Pregnancy rates  Talk to your healthcare provider about the effectiveness of this birth control method.  Using time-release hormones  Methods to deliver hormones include:  · A skin patch placed on your stomach, buttocks, arm, or shoulder. You replace the patch weekly.  · A ring that you insert in your vagina, leave in for 3 weeks, and remove for 1 week.  · Injections given in your arm or buttocks once every 3 months by your healthcare provider.  · An implant placed under the skin in the upper arm by your healthcare provider. This can be left in place for up to 3 years.  · The progestin IUDs placed by your healthcare provider. These can be left in place for 3 to 5 years depending on which one is chosen.  Pros  · Lowest pregnancy rate of the birth control methods that can be reversed  · No interruption to sex  · Easy to use  · Dont require taking a pill each day  · May decrease menstrual cramps, menstrual flow, and acne  Cons  · Do not protect against sexually transmitted infections (STIs)  · May cause irregular periods  · May cause side effects such as nausea, weight gain, headaches, breast tenderness, fatigue, or mood changes (these often go away within 3 months)  · May take up to a year for you to become fertile (able to get pregnant) after stopping injections  · May increase the risk of blood clots, heart attack, and stroke  Time-release hormones may not be for you  Time-release  hormones may not be for you if:  · You are a smoker and over age 35  · You have high blood pressure or gallbladder, liver, certain lipid disorders, cerebrovascular disease (stroke), or heart disease  · You have diabetes, migraines, thromboembolic disorder (clot in vein or artery), lupus, or take medicines that may interfere with the hormones  In these cases, discuss the risks with your healthcare provider.  Date Last Reviewed: 3/1/2017  © 5822-0561 [employer]. [address] [place] [address], [place] [address]. All rights reserved. This information is not intended as a substitute for professional medical care. Always follow your healthcare professional's instructions.        Breast Health: Breast Self-Awareness  What is breast self-awareness?  Breast self-awareness is knowing how your breasts normally look and feel. Your breasts change as you go through different stages of your life. So its important to learn what is normal for your breasts. Breast self-awareness helps you notice any changes in your breasts right away. Report any changes to your healthcare provider.  Why is breast self-awareness important?  Many experts now say that women should focus on breast self-awareness instead of doing a breast self-examination (BSE). These experts include the American Cancer Society, the U.S. Preventive Services Task Force, and the American Congress of Obstetricians and Gynecologists. Some experts even advise not teaching women to do a BSE. Thats because research hasnt shown a clear benefit to doing BSEs.  Breast self-awareness is different than a BSE. Breast self-awareness isnt about following a certain method and schedule. Its about knowing what's normal for your breasts. That way you can notice even small changes right away. If you see any changes, report them to your healthcare provider.  Changes to look for  Call your healthcare provider if you find any changes in your breasts that concern you. These changes  may include:  · A lump  · Nipple discharge other than breast milk, especially a bloody discharge  · Swelling  · A change in size or shape  · Skin irritation, such as redness, thickening, or dimpling of the skin  · Swollen lymph nodes in the armpit  · Nipple problems, such as pain or redness  If you find a lump  Contact your provider if you find lumpiness in one breast, feel something different in the tissue, or feel a definite lump. Sometimes lumpiness may be due to menstrual changes. But there may be reason for concern.  Your provider may want to see you right away if you have:  · Nipple discharge that is bloody  · Skin changes on your breast, such as dimpling or puckering  Its normal to be upset if you find a lump. But its important to contact your provider right away. Remember that most breast lumps are benign. This means they are not cancer.  Date Last Reviewed: 8/10/2015  © 7323-7574 [employer]. [address] [place] [address], [place] [address]. All rights reserved. This information is not intended as a substitute for professional medical care. Always follow your healthcare professional's instructions.

## 2019-10-25 ENCOUNTER — TELEPHONE (OUTPATIENT)
Dept: OBSTETRICS AND GYNECOLOGY | Facility: CLINIC | Age: 24
End: 2019-10-25

## 2019-10-25 LAB
HAV IGM SERPL QL IA: NEGATIVE
HBV CORE IGM SERPL QL IA: NEGATIVE
HBV SURFACE AG SERPL QL IA: NEGATIVE
HCV AB SERPL QL IA: NEGATIVE
HIV 1+2 AB+HIV1 P24 AG SERPL QL IA: NEGATIVE
RPR SER QL: NORMAL

## 2019-10-25 RX ORDER — AZITHROMYCIN 500 MG/1
TABLET, FILM COATED ORAL
Qty: 2 TABLET | Refills: 0 | Status: SHIPPED | OUTPATIENT
Start: 2019-10-25 | End: 2019-12-19

## 2019-10-25 NOTE — TELEPHONE ENCOUNTER
----- Message from Cassandra Renee sent at 10/25/2019  2:12 PM CDT -----  Contact: pt   She's calling in regards to Rx being sent over       pls call pt back at [phone] (home)

## 2019-10-30 ENCOUNTER — PATIENT MESSAGE (OUTPATIENT)
Dept: OBSTETRICS AND GYNECOLOGY | Facility: CLINIC | Age: 24
End: 2019-10-30

## 2019-12-10 ENCOUNTER — PATIENT MESSAGE (OUTPATIENT)
Dept: OBSTETRICS AND GYNECOLOGY | Facility: CLINIC | Age: 24
End: 2019-12-10

## 2019-12-11 ENCOUNTER — PATIENT MESSAGE (OUTPATIENT)
Dept: OBSTETRICS AND GYNECOLOGY | Facility: CLINIC | Age: 24
End: 2019-12-11

## 2019-12-11 ENCOUNTER — TELEPHONE (OUTPATIENT)
Dept: OBSTETRICS AND GYNECOLOGY | Facility: CLINIC | Age: 24
End: 2019-12-11

## 2019-12-19 ENCOUNTER — OFFICE VISIT (OUTPATIENT)
Dept: OBSTETRICS AND GYNECOLOGY | Facility: CLINIC | Age: 24
End: 2019-12-19
Payer: COMMERCIAL

## 2019-12-19 VITALS
HEIGHT: 67 IN | BODY MASS INDEX: 25.19 KG/M2 | SYSTOLIC BLOOD PRESSURE: 124 MMHG | WEIGHT: 160.5 LBS | DIASTOLIC BLOOD PRESSURE: 68 MMHG

## 2019-12-19 DIAGNOSIS — A74.9 CHLAMYDIA: Primary | ICD-10-CM

## 2019-12-19 PROCEDURE — 3008F PR BODY MASS INDEX (BMI) DOCUMENTED: ICD-10-PCS | Mod: CPTII,S$GLB,, | Performed by: NURSE PRACTITIONER

## 2019-12-19 PROCEDURE — 99999 PR PBB SHADOW E&M-EST. PATIENT-LVL III: CPT | Mod: PBBFAC,,, | Performed by: NURSE PRACTITIONER

## 2019-12-19 PROCEDURE — 99214 PR OFFICE/OUTPT VISIT, EST, LEVL IV, 30-39 MIN: ICD-10-PCS | Mod: S$GLB,,, | Performed by: NURSE PRACTITIONER

## 2019-12-19 PROCEDURE — 99999 PR PBB SHADOW E&M-EST. PATIENT-LVL III: ICD-10-PCS | Mod: PBBFAC,,, | Performed by: NURSE PRACTITIONER

## 2019-12-19 PROCEDURE — 87491 CHLMYD TRACH DNA AMP PROBE: CPT

## 2019-12-19 PROCEDURE — 99214 OFFICE O/P EST MOD 30 MIN: CPT | Mod: S$GLB,,, | Performed by: NURSE PRACTITIONER

## 2019-12-19 PROCEDURE — 3008F BODY MASS INDEX DOCD: CPT | Mod: CPTII,S$GLB,, | Performed by: NURSE PRACTITIONER

## 2019-12-19 NOTE — PROGRESS NOTES
"Mary Beth Cain is a 24 y.o. female  presents for gerardo for chlamydia dx and treated in 10/24/19.   Wants to know why in past did not get pregnant when not using condoms.  Could not answer this for pt, instructed her that she would need to be off birth control, keep menses tracker and ovulation with timed intercourse and then if no pregnancy in at least a year of trying this to see someone for pregnancy.  Does not even have a partner at this time.  Also feels she has nodules in groin area frequently, discussed infection and lymph nodes.     Past Medical History:   Diagnosis Date    Chronic low back pain     Constipation     MgSO4, stool softeners    Environmental allergies     Gastritis     GERD (gastroesophageal reflux disease)     Headache(784.0)     Osteopenia determined by x-ray 2017    Vitamin D deficiency      Past Surgical History:   Procedure Laterality Date    TONSILLECTOMY, ADENOIDECTOMY       Social History     Tobacco Use    Smoking status: Never Smoker    Smokeless tobacco: Never Used   Substance Use Topics    Alcohol use: No    Drug use: No     Family History   Problem Relation Age of Onset    Diabetes Maternal Uncle     Diabetes Paternal Uncle     Osteoporosis Mother     Ovarian cysts Mother      OB History    Para Term  AB Living   0 0 0 0 0 0   SAB TAB Ectopic Multiple Live Births   0 0 0 0 0       /68   Ht 5' 7" (1.702 m)   Wt 72.8 kg (160 lb 7.9 oz)   LMP 2019 (Exact Date)   BMI 25.14 kg/m²     ROS:  Per hpi    PHYSICAL EXAM:  VULVA: normal appearing vulva with no masses, tenderness or lesions, no lymph nodes noted in groin today, VAGINA: normal appearing vagina with normal color and discharge, no lesions, CERVIX: normal appearing cervix without discharge or lesions, DNA probe for chlamydia and GC obtained, UTERUS: uterus is normal size, shape, consistency and nontender, ADNEXA: normal adnexa in size, nontender and no masses    ASSESSMENT and " PLAN:  1. Chlamydia  C. trachomatis/N. gonorrhoeae by AMP DNA       Patient was counseled today on STD prevention   Preconception counseling although not actively seeking pregnancy

## 2019-12-19 NOTE — PATIENT INSTRUCTIONS
Teens: About STDs  If youre having sex, or thinking about having sex, you need to know about sexually transmitted diseases or STDs. STDs can cause serious health problems. And not all can be cured. But there are ways to protect yourself and others.    How do you catch an STD?  Its simple. You catch an STD by having sex with an infected partner. Some STDs spread when body fluids (semen, vaginal fluids, saliva, blood) get passed during sex. Others spread by touching infected areas. It doesnt matter what kind of sex it is. You can catch an STD if sex involves the mouth (oral sex), vagina (vaginal sex), or anus (anal sex). The risks of catching an STD are even higher if:  · You have more than 1 sex partner   · Your partner has sex with other people  · You dont use latex condoms  What can you do?  To prevent problems now--and in the future--decide if its the right time to have sex.  Its OK to wait. Not having sex is the only sure way to prevent STDs.  But if you are having sex...  · Always use a latex condom during sex. Why latex? When used correctly and constantly during sexual intercourse, latex condoms are very effective in decreasing the transmission of HIV and other viruses compared to other types of condoms (for example, those made of lambskin or natural membranes).  · Limit how many sex partners you have. The more people you have sex with, the greater your risk of catching an STD.  · Get checked if you think you have an STD. Then you can be treated if you do have an STD.  Date Last Reviewed: 1/1/2017 © 2000-2017 [employer]. [address] [place] [address], [place] [address]. All rights reserved. This information is not intended as a substitute for professional medical care. Always follow your healthcare professional's instructions.        Chlamydia  Chlamydia is a very common sexually transmitted disease (STD). Most people do not have symptoms. Because of this, chlamydia may not be noticed until it  causes severe problems. Left untreated, this infection can cause women and men to become sterile. This means they will not be able to have children.    Symptoms  Many people with chlamydia have no symptoms. Women are more likely than men not to have symptoms.  If symptoms show up in women, they include:  · Abnormal vaginal discharge  · Bleeding between periods  · Pain or burning during urination  If symptoms show up in men, they include:  · Clear discharge (drip) from the penis or anus  · Pain or burning during urination  These symptoms usually disappear after a few weeks, whether or not you are treated. However, if you are not treated, the chlamydia will still be present and can cause long-term problems.  Potential problems  If the infection is not treated, it can lead to more serious health problems. In women, this can be pelvic inflammatory disease (PID). PID can make a woman sterile. It can also cause an ectopic (tubal) pregnancy. This type of pregnancy cannot be carried to term. Symptoms of PID include fever, pain during sex, and pain in the belly.  Sexually active women should get checked for chlamydia regularly. This can help prevent PID.   Treatment  When found early, chlamydia can be treated. It can be cured with antibiotic medicines. If you have it, tell your partner right away. Because women often dont have symptoms, men should ask their partners to get tested.  Prevention  Know your partners history. Protect yourself by using a latex condom whenever you have sex. If you are pregnant, take extra care to get proper treatment. Untreated chlamydia in a pregnant woman can pass the infection on to the baby, causing possible eye, ear, or lung problems. There is also the possibility of a premature delivery.  Resources  American [employer] STD Hotline  809.281.5023  www.ashastd.org  Centers for Disease Control and Prevention  362.991.2671  www.cdc.gov/std   Date Last Reviewed: 12/1/2016  ©  8503-0395 [employer]. [address] [place] [address], [place] [address]. All rights reserved. This information is not intended as a substitute for professional medical care. Always follow your healthcare professional's instructions.        What Are Sexually Transmitted Diseases (STDs)?  A sexually transmitted disease (STD) is a disease that is spread during sex. (An STD can also be called STI for sexually transmitted infection.) You can become infected with an STD if you have sex with someone who has an STD. Any sex that involves the penis, vagina, anus, or mouth can spread disease. Some STDs spread through body fluids such as semen, vaginal fluid, or blood. Others spread through contact with affected skin.  Who is at risk?     Places on or in the body where STDs cause damage include reproductive organs, the rectum, and the mouth.   It doesnt matter if youre straight or lott, male or female, young or old. Any person who has sex can get an STD. Your risk increases if:  · You have more than one partner. The more partners you have, the greater your risk.  · Your partner has other partners. If your partner is exposed to an STD, you could be, too.  · You or your partner have had sex with other people in the past. Either of you might be carrying an STD from an earlier partner.  · You have an STD. The STD may cause sores or other health problems that increase your risk of new infections. Your risk will stay high unless you change the behaviors that put you at risk of the current infection.  Prevent future problems  Left untreated, certain STDs can lead to cancer or even death. Some can harm unborn babies whose mothers are infected. Others can cause you to not be able to have children (sterility) or can affect changes in behavior or your ability to think. You can prevent these problems with safer sex, regular checkups, and early treatment. Always use a latex condom when you have sex. Get tested if youre at risk.  And get treated early if you have an STD.  Getting checked  The only sure way to know if you have an STD is to get checked by a healthcare provider. If you notice a change in how your body looks or feels, have it checked out. But keep in mind, STDs dont always show symptoms. So if youre at risk of STDs, get checked regularly. If you find you have an STD, be sure your partner gets treatment, too. If not, his or her health is at risk. And left untreated, your partner could pass the STD back to you, or on to others.  Common symptoms  Be alert to any changes in your body and your partners body. Symptoms may appear in or near the vagina, penis, rectum, mouth, or throat. They include:  · Unusual discharge  · Lumps, bumps, or rashes  · Sores that may be painful, itchy, or painless  · Itchy skin  · Burning with urination  · Pain in the pelvis, belly (abdomen), or rectum  Even if you dont have symptoms  You may have an STD, even if you dont have symptoms. If you think you are at risk, get checked. Go to a clinic or to your healthcare provider. If your partner has an STD, you need to be tested too, even if you feel fine.  Vaccines to prevent disease  Vaccines (also called immunizations) are available to prevent hepatitis A and hepatitis B. These are two kinds of STDs. There is also a vaccine to prevent HPV. This is a virus that can be passed from person to person through sexual contact. Ask your healthcare provider whether any of these vaccines is right for you.   Date Last Reviewed: 11/1/2016 © 2000-2017 [employer]. [address] [place] [address], [place] [address]. All rights reserved. This information is not intended as a substitute for professional medical care. Always follow your healthcare professional's instructions.        Understanding STDs    When it comes to sex, nothing is risk-free. Any sexual contact with the penis, vagina, anus, or mouth can spread a sexually transmitted disease (STD). The only  sure way to prevent STDs is abstinence (not having sex). But there are ways to make sex safer. Use a latex condom each time you have sex. And choose your partner wisely.  Use condoms for safer sex  If you have sex, latex condoms provide the best protection against STDs. Latex condoms stop the exchange of body fluids that carry certain STDs. They also limit contact with affected skin. Be aware though, a condom doesnt cover all skin. So, affected skin that is not covered can still transfer disease. But youre safer with a condom than without one. Use a condom even if you use other birth control. While birth control methods like the pill or IUD help prevent pregnancy, they do not protect against STDs.  Choose the right condom  Condoms made of latex prevent disease best. If youre allergic to latex, use polyurethane condoms instead. Male condoms fit over the penis. Female condoms line the vagina. Before buying a condom, read the label to be sure it prevents disease. Some novelty condoms dont.  The right lubricant helps  Buy lubricated condoms or use lubricant. This provides greater comfort and reduces the risk of condom breakage. Use only water-based lubricants. Dont use oil, lotion, or petroleum jelly. They can weaken the condom, causing breakage. Also, you may want to choose lubricants without nonoxynol-9. Its now known that this spermicide does not prevent disease and may cause irritation.  Use condoms correctly  For condoms to work, they must be used the right way. Keep these tips in mind:  · Use a new latex condom each time you have sex. Slip the condom on the penis before any contact is made.  · When ready to withdraw, hold the rim of the condom as the penis pulls out. This prevents the condom from slipping off.  · Check the expiration date before using a condom.  · Dont store condoms in places that can get hot, such as a car or a wallet that is carried in a back pocket.  Get to know your partner  Safer sex  is a process. It involves getting to know your partner and making informed choices. Ask each other how many partners you have had in the past, and how many you have now. Find out if either of you has an STD. If you decide to have sex, use a condom each time. Dont stop using condoms unless youre sure neither of you has other partners and youve both been tested to confirm you dont have STDs. Then stay free of disease by having sex only with each other (monogamy).  Keep your cool  Dont let alcohol or drugs cloud your judgment. They could lead you to have sex with someone you wouldnt have chosen if you were sober. Or, you might forget to use a condom. If you do plan to have sex, keep a latex condom with you. Dont wait until youre in the heat of passion to try to find one.  Consider abstinence  The only way to be sure you wont get an STD is to abstain from sex. Abstinence is a choice that many people make at some point in their lives. Maybe you want to wait until you are sure youre ready before you have sex. Maybe youd like a break from the responsibilities of sex for a while. Or maybe you just want to know your partner better before taking the next step. Abstinence is a choice you can make now to protect your future.  Date Last Reviewed: 12/1/2016  © 8599-9367 [employer]. [address] [place] [address], [place] [address]. All rights reserved. This information is not intended as a substitute for professional medical care. Always follow your healthcare professional's instructions.        Chlamydia, Treated (Female)    You have an infection called chlamydia. This is a sexually transmitted disease (STD). It can be passed easily from one person to another. It is passed on by sexual contact with an infected partner. Chlamydia can infect the internal sex organs. These are the cervix, uterus, and Fallopian tubes. It can also infect the mouth, throat, and anus. But this happens less often.  Women with an  infection in the cervix may have no symptoms. Or they may have only mild symptoms early in the illness. Thats why you are able to pass this infection on without knowing you have it.  When symptoms do occur, they usually start 2 to 10 days after you are exposed to chlamydia. You may have a thick vaginal discharge. You may have pain or burning when you urinate. The infection is called pelvic inflammatory disease (PID) if it spreads to the Fallopian tubes. This causes lower belly (abdominal) pain and fever. Chlamydia that isnt treated can make you unable to have children (infertility). This is because it harms the Fallopian tubes. PID also makes it more likely for you to have a tubal (ectopic) pregnancy in the future.  Chlamydia can be treated and cured. Treatment is with antibiotic medicine.  Home care  Follow these guidelines when caring for yourself at home:  · Your sexual partner must be treated at the same time. This is true even if he or she has no symptoms. Your partner should contact his or her own healthcare provider for treatment. He or she can also go to an urgent care clinic or the public health department.  · Dont have sex until both you and your partner have finished taking all of the antibiotic medicine, and your healthcare provider has told you that you are cured.  · Take all medicines until they are finished. Otherwise your symptoms may come back.  · Learn about safe sex practices and use these in the future. The safest sex is with a partner who has tested negative and only has sex with you. Condoms can keep you from spreading some STDs. These include gonorrhea, chlamydia, and HIV. But condoms are not a guarantee.  Follow-up care  Follow up with your healthcare provider, or as advised. If a culture test was taken, you may call as directed for the results. You should have another culture test 4 to 6 weeks after treatment. This is to make sure the infection is gone. Follow up with your provider or  your local public health department for complete STD screening, including HIV testing. People who get a chlamydia infection often get another STD at the same time such as gonorrhea or syphilis. For more information about STDs, contact the [facility] National STD Hotline at 236-580-4466.  When to seek medical advice  Call your healthcare provider right away if any of these occur:  · You dont get better after 3 days of treatment  · New pain in your lower abdomen or back  · Pain in your lower abdomen or back gets worse  · Unexpected vaginal bleeding  · Weakness, dizziness, or fainting  · Repeated vomiting  · You cant urinate because of pain  · Rash or joint pain  · Painful open sores around the outer vagina  · Enlarged, painful lumps (lymph nodes) in your groin  · Fever of 100.4°F (38°C) or higher, or as directed by your healthcare provider   Date Last Reviewed: 1/1/2017  © 3073-3444 [employer]. [address] [place] [address], [place] [address]. All rights reserved. This information is not intended as a substitute for professional medical care. Always follow your healthcare professional's instructions.

## 2019-12-19 NOTE — LETTER
December 19, 2019      Sterling Chow MD  [address]  [place] [address]           [facility]  [address] [place] [place] [address]  Phone: [phone]  Fax: [phone]          Patient: Mary Beth Cain   MR Number: [id number]   YOB: 1995   Date of Visit: 12/19/2019       Dear Dr. Sterling Chow:    Thank you for referring Mary Beth Cain to me for evaluation. Attached you will find relevant portions of my assessment and plan of care.    If you have questions, please do not hesitate to call me. I look forward to following Mary Beth Cain along with you.    Sincerely,    Lillie Herrera, NP    Enclosure  CC:  No Recipients    If you would like to receive this communication electronically, please contact [email or web address] or [phone] to request more information on [facility] Link access.    For providers and/or their staff who would like to refer a patient to [facility], please contact us through our one-stop-shop provider referral line, [facility] , at [phone].    If you feel you have received this communication in error or would no longer like to receive these types of communications, please e-mail [email or web address]

## 2019-12-20 ENCOUNTER — TELEPHONE (OUTPATIENT)
Dept: OBSTETRICS AND GYNECOLOGY | Facility: CLINIC | Age: 24
End: 2019-12-20

## 2019-12-20 DIAGNOSIS — A74.9 CHLAMYDIA: Primary | ICD-10-CM

## 2019-12-20 LAB
C TRACH DNA SPEC QL NAA+PROBE: DETECTED
N GONORRHOEA DNA SPEC QL NAA+PROBE: NOT DETECTED

## 2019-12-20 RX ORDER — DOXYCYCLINE 100 MG/1
100 CAPSULE ORAL 2 TIMES DAILY
Qty: 14 CAPSULE | Refills: 0 | Status: SHIPPED | OUTPATIENT
Start: 2019-12-20 | End: 2019-12-27

## 2019-12-20 NOTE — TELEPHONE ENCOUNTER
Spoke to pt in regards to her test results she was returning a call. Informed her of results and told her to take the medication and to come back in 6 weeks for a f/u. Pt verbalized understanding.

## 2022-04-29 ENCOUNTER — PATIENT MESSAGE (OUTPATIENT)
Dept: ADMINISTRATIVE | Facility: OTHER | Age: 27
End: 2022-04-29
Payer: COMMERCIAL